# Patient Record
Sex: FEMALE | Race: OTHER | HISPANIC OR LATINO
[De-identification: names, ages, dates, MRNs, and addresses within clinical notes are randomized per-mention and may not be internally consistent; named-entity substitution may affect disease eponyms.]

---
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
## 2020-06-06 ENCOUNTER — TRANSCRIPTION ENCOUNTER (OUTPATIENT)
Age: 20
End: 2020-06-06

## 2020-08-25 ENCOUNTER — INPATIENT (INPATIENT)
Facility: HOSPITAL | Age: 20
LOS: 1 days | Discharge: HOME | DRG: 100 | End: 2020-08-27
Attending: INTERNAL MEDICINE | Admitting: INTERNAL MEDICINE
Payer: MEDICAID

## 2020-08-25 VITALS — WEIGHT: 121.25 LBS

## 2020-08-25 DIAGNOSIS — R56.9 UNSPECIFIED CONVULSIONS: ICD-10-CM

## 2020-08-25 LAB
ALBUMIN SERPL ELPH-MCNC: 3.7 G/DL — SIGNIFICANT CHANGE UP (ref 3.3–5.2)
ALBUMIN SERPL ELPH-MCNC: 3.7 G/DL — SIGNIFICANT CHANGE UP (ref 3.3–5.2)
ALP SERPL-CCNC: 44 U/L — SIGNIFICANT CHANGE UP (ref 40–120)
ALP SERPL-CCNC: 44 U/L — SIGNIFICANT CHANGE UP (ref 40–120)
ALT FLD-CCNC: 20 U/L — SIGNIFICANT CHANGE UP
ALT FLD-CCNC: 21 U/L — SIGNIFICANT CHANGE UP
AMPHET UR-MCNC: POSITIVE
ANION GAP SERPL CALC-SCNC: 12 MMOL/L — SIGNIFICANT CHANGE UP (ref 5–17)
ANION GAP SERPL CALC-SCNC: 14 MMOL/L — SIGNIFICANT CHANGE UP (ref 5–17)
APAP SERPL-MCNC: <7.5 UG/ML — LOW (ref 10–26)
APPEARANCE UR: CLEAR — SIGNIFICANT CHANGE UP
APTT BLD: 18.8 SEC — LOW (ref 27.5–35.5)
AST SERPL-CCNC: 25 U/L — SIGNIFICANT CHANGE UP
AST SERPL-CCNC: 27 U/L — SIGNIFICANT CHANGE UP
BACTERIA # UR AUTO: ABNORMAL
BARBITURATES UR SCN-MCNC: NEGATIVE — SIGNIFICANT CHANGE UP
BASOPHILS # BLD AUTO: 0.03 K/UL — SIGNIFICANT CHANGE UP (ref 0–0.2)
BASOPHILS NFR BLD AUTO: 0.2 % — SIGNIFICANT CHANGE UP (ref 0–2)
BENZODIAZ UR-MCNC: POSITIVE
BILIRUB SERPL-MCNC: 0.2 MG/DL — LOW (ref 0.4–2)
BILIRUB SERPL-MCNC: 0.3 MG/DL — LOW (ref 0.4–2)
BILIRUB UR-MCNC: NEGATIVE — SIGNIFICANT CHANGE UP
BLD GP AB SCN SERPL QL: SIGNIFICANT CHANGE UP
BUN SERPL-MCNC: 10 MG/DL — SIGNIFICANT CHANGE UP (ref 8–20)
BUN SERPL-MCNC: 9 MG/DL — SIGNIFICANT CHANGE UP (ref 8–20)
CALCIUM SERPL-MCNC: 8.1 MG/DL — LOW (ref 8.6–10.2)
CALCIUM SERPL-MCNC: 8.3 MG/DL — LOW (ref 8.6–10.2)
CHLORIDE SERPL-SCNC: 110 MMOL/L — HIGH (ref 98–107)
CHLORIDE SERPL-SCNC: 110 MMOL/L — HIGH (ref 98–107)
CK MB CFR SERPL CALC: 2.7 NG/ML — SIGNIFICANT CHANGE UP (ref 0–6.7)
CK SERPL-CCNC: 202 U/L — HIGH (ref 25–170)
CO2 SERPL-SCNC: 17 MMOL/L — LOW (ref 22–29)
CO2 SERPL-SCNC: 20 MMOL/L — LOW (ref 22–29)
COCAINE METAB.OTHER UR-MCNC: NEGATIVE — SIGNIFICANT CHANGE UP
COLOR SPEC: YELLOW — SIGNIFICANT CHANGE UP
CREAT SERPL-MCNC: 0.65 MG/DL — SIGNIFICANT CHANGE UP (ref 0.5–1.3)
CREAT SERPL-MCNC: 0.68 MG/DL — SIGNIFICANT CHANGE UP (ref 0.5–1.3)
DIFF PNL FLD: ABNORMAL
EOSINOPHIL # BLD AUTO: 0.01 K/UL — SIGNIFICANT CHANGE UP (ref 0–0.5)
EOSINOPHIL NFR BLD AUTO: 0.1 % — SIGNIFICANT CHANGE UP (ref 0–6)
EPI CELLS # UR: SIGNIFICANT CHANGE UP
ETHANOL SERPL-MCNC: <10 MG/DL — SIGNIFICANT CHANGE UP
GAS PNL BLDA: SIGNIFICANT CHANGE UP
GLUCOSE SERPL-MCNC: 76 MG/DL — SIGNIFICANT CHANGE UP (ref 70–99)
GLUCOSE SERPL-MCNC: 78 MG/DL — SIGNIFICANT CHANGE UP (ref 70–99)
GLUCOSE UR QL: NEGATIVE MG/DL — SIGNIFICANT CHANGE UP
HCG SERPL-ACNC: <4 MIU/ML — SIGNIFICANT CHANGE UP
HCT VFR BLD CALC: 38.4 % — SIGNIFICANT CHANGE UP (ref 34.5–45)
HGB BLD-MCNC: 13 G/DL — SIGNIFICANT CHANGE UP (ref 11.5–15.5)
HYALINE CASTS # UR AUTO: NEGATIVE /LPF — SIGNIFICANT CHANGE UP
IMM GRANULOCYTES NFR BLD AUTO: 0.8 % — SIGNIFICANT CHANGE UP (ref 0–1.5)
INR BLD: 0.99 RATIO — SIGNIFICANT CHANGE UP (ref 0.88–1.16)
KETONES UR-MCNC: ABNORMAL
LEUKOCYTE ESTERASE UR-ACNC: NEGATIVE — SIGNIFICANT CHANGE UP
LYMPHOCYTES # BLD AUTO: 1.01 K/UL — SIGNIFICANT CHANGE UP (ref 1–3.3)
LYMPHOCYTES # BLD AUTO: 6.5 % — LOW (ref 13–44)
MAGNESIUM SERPL-MCNC: 2 MG/DL — SIGNIFICANT CHANGE UP (ref 1.6–2.6)
MCHC RBC-ENTMCNC: 29.8 PG — SIGNIFICANT CHANGE UP (ref 27–34)
MCHC RBC-ENTMCNC: 33.9 GM/DL — SIGNIFICANT CHANGE UP (ref 32–36)
MCV RBC AUTO: 88.1 FL — SIGNIFICANT CHANGE UP (ref 80–100)
METHADONE UR-MCNC: NEGATIVE — SIGNIFICANT CHANGE UP
MONOCYTES # BLD AUTO: 1.03 K/UL — HIGH (ref 0–0.9)
MONOCYTES NFR BLD AUTO: 6.6 % — SIGNIFICANT CHANGE UP (ref 2–14)
NEUTROPHILS # BLD AUTO: 13.33 K/UL — HIGH (ref 1.8–7.4)
NEUTROPHILS NFR BLD AUTO: 85.8 % — HIGH (ref 43–77)
NITRITE UR-MCNC: NEGATIVE — SIGNIFICANT CHANGE UP
OPIATES UR-MCNC: NEGATIVE — SIGNIFICANT CHANGE UP
PCP SPEC-MCNC: SIGNIFICANT CHANGE UP
PCP UR-MCNC: NEGATIVE — SIGNIFICANT CHANGE UP
PH UR: 5 — SIGNIFICANT CHANGE UP (ref 5–8)
PHOSPHATE SERPL-MCNC: 2 MG/DL — LOW (ref 2.4–4.7)
PLATELET # BLD AUTO: 351 K/UL — SIGNIFICANT CHANGE UP (ref 150–400)
POTASSIUM SERPL-MCNC: 3.4 MMOL/L — LOW (ref 3.5–5.3)
POTASSIUM SERPL-MCNC: 4 MMOL/L — SIGNIFICANT CHANGE UP (ref 3.5–5.3)
POTASSIUM SERPL-SCNC: 3.4 MMOL/L — LOW (ref 3.5–5.3)
POTASSIUM SERPL-SCNC: 4 MMOL/L — SIGNIFICANT CHANGE UP (ref 3.5–5.3)
PROCALCITONIN SERPL-MCNC: 0.03 NG/ML — SIGNIFICANT CHANGE UP (ref 0.02–0.1)
PROT SERPL-MCNC: 6.6 G/DL — SIGNIFICANT CHANGE UP (ref 6.6–8.7)
PROT SERPL-MCNC: 6.8 G/DL — SIGNIFICANT CHANGE UP (ref 6.6–8.7)
PROT UR-MCNC: 30 MG/DL
PROTHROM AB SERPL-ACNC: 11.5 SEC — SIGNIFICANT CHANGE UP (ref 10.6–13.6)
RBC # BLD: 4.36 M/UL — SIGNIFICANT CHANGE UP (ref 3.8–5.2)
RBC # FLD: 14.1 % — SIGNIFICANT CHANGE UP (ref 10.3–14.5)
RBC CASTS # UR COMP ASSIST: ABNORMAL /HPF (ref 0–4)
SALICYLATES SERPL-MCNC: <0.6 MG/DL — LOW (ref 10–20)
SARS-COV-2 RNA SPEC QL NAA+PROBE: SIGNIFICANT CHANGE UP
SODIUM SERPL-SCNC: 141 MMOL/L — SIGNIFICANT CHANGE UP (ref 135–145)
SODIUM SERPL-SCNC: 142 MMOL/L — SIGNIFICANT CHANGE UP (ref 135–145)
SP GR SPEC: 1.03 — HIGH (ref 1.01–1.02)
THC UR QL: NEGATIVE — SIGNIFICANT CHANGE UP
TROPONIN T SERPL-MCNC: <0.01 NG/ML — SIGNIFICANT CHANGE UP (ref 0–0.06)
UROBILINOGEN FLD QL: NEGATIVE MG/DL — SIGNIFICANT CHANGE UP
WBC # BLD: 15.54 K/UL — HIGH (ref 3.8–10.5)
WBC # FLD AUTO: 15.54 K/UL — HIGH (ref 3.8–10.5)
WBC UR QL: NEGATIVE — SIGNIFICANT CHANGE UP

## 2020-08-25 PROCEDURE — 70450 CT HEAD/BRAIN W/O DYE: CPT | Mod: 26

## 2020-08-25 PROCEDURE — 71045 X-RAY EXAM CHEST 1 VIEW: CPT | Mod: 26

## 2020-08-25 PROCEDURE — 99223 1ST HOSP IP/OBS HIGH 75: CPT

## 2020-08-25 PROCEDURE — 99291 CRITICAL CARE FIRST HOUR: CPT | Mod: 25

## 2020-08-25 PROCEDURE — 31500 INSERT EMERGENCY AIRWAY: CPT

## 2020-08-25 PROCEDURE — 99233 SBSQ HOSP IP/OBS HIGH 50: CPT

## 2020-08-25 RX ORDER — POTASSIUM PHOSPHATE, MONOBASIC POTASSIUM PHOSPHATE, DIBASIC 236; 224 MG/ML; MG/ML
30 INJECTION, SOLUTION INTRAVENOUS ONCE
Refills: 0 | Status: COMPLETED | OUTPATIENT
Start: 2020-08-25 | End: 2020-08-25

## 2020-08-25 RX ORDER — CHLORHEXIDINE GLUCONATE 213 G/1000ML
15 SOLUTION TOPICAL EVERY 12 HOURS
Refills: 0 | Status: DISCONTINUED | OUTPATIENT
Start: 2020-08-25 | End: 2020-08-25

## 2020-08-25 RX ORDER — PROPOFOL 10 MG/ML
20 INJECTION, EMULSION INTRAVENOUS
Qty: 1000 | Refills: 0 | Status: DISCONTINUED | OUTPATIENT
Start: 2020-08-25 | End: 2020-08-25

## 2020-08-25 RX ORDER — SODIUM CHLORIDE 9 MG/ML
1000 INJECTION, SOLUTION INTRAVENOUS ONCE
Refills: 0 | Status: COMPLETED | OUTPATIENT
Start: 2020-08-25 | End: 2020-08-25

## 2020-08-25 RX ORDER — ETOMIDATE 2 MG/ML
20 INJECTION INTRAVENOUS ONCE
Refills: 0 | Status: COMPLETED | OUTPATIENT
Start: 2020-08-25 | End: 2020-08-25

## 2020-08-25 RX ORDER — LEVETIRACETAM 250 MG/1
1000 TABLET, FILM COATED ORAL EVERY 12 HOURS
Refills: 0 | Status: DISCONTINUED | OUTPATIENT
Start: 2020-08-25 | End: 2020-08-26

## 2020-08-25 RX ORDER — LEVETIRACETAM 250 MG/1
1000 TABLET, FILM COATED ORAL ONCE
Refills: 0 | Status: COMPLETED | OUTPATIENT
Start: 2020-08-25 | End: 2020-08-25

## 2020-08-25 RX ORDER — ENOXAPARIN SODIUM 100 MG/ML
40 INJECTION SUBCUTANEOUS DAILY
Refills: 0 | Status: DISCONTINUED | OUTPATIENT
Start: 2020-08-25 | End: 2020-08-27

## 2020-08-25 RX ORDER — PANTOPRAZOLE SODIUM 20 MG/1
40 TABLET, DELAYED RELEASE ORAL DAILY
Refills: 0 | Status: DISCONTINUED | OUTPATIENT
Start: 2020-08-25 | End: 2020-08-26

## 2020-08-25 RX ORDER — SUCCINYLCHOLINE CHLORIDE 100 MG/5ML
100 SYRINGE (ML) INTRAVENOUS ONCE
Refills: 0 | Status: COMPLETED | OUTPATIENT
Start: 2020-08-25 | End: 2020-08-25

## 2020-08-25 RX ORDER — CHLORHEXIDINE GLUCONATE 213 G/1000ML
1 SOLUTION TOPICAL
Refills: 0 | Status: DISCONTINUED | OUTPATIENT
Start: 2020-08-25 | End: 2020-08-27

## 2020-08-25 RX ADMIN — PANTOPRAZOLE SODIUM 40 MILLIGRAM(S): 20 TABLET, DELAYED RELEASE ORAL at 12:50

## 2020-08-25 RX ADMIN — Medication 100 MILLIGRAM(S): at 09:05

## 2020-08-25 RX ADMIN — POTASSIUM PHOSPHATE, MONOBASIC POTASSIUM PHOSPHATE, DIBASIC 83.33 MILLIMOLE(S): 236; 224 INJECTION, SOLUTION INTRAVENOUS at 14:08

## 2020-08-25 RX ADMIN — PROPOFOL 6.6 MICROGRAM(S)/KG/MIN: 10 INJECTION, EMULSION INTRAVENOUS at 13:30

## 2020-08-25 RX ADMIN — PROPOFOL 6.6 MICROGRAM(S)/KG/MIN: 10 INJECTION, EMULSION INTRAVENOUS at 09:30

## 2020-08-25 RX ADMIN — LEVETIRACETAM 400 MILLIGRAM(S): 250 TABLET, FILM COATED ORAL at 10:15

## 2020-08-25 RX ADMIN — SODIUM CHLORIDE 2000 MILLILITER(S): 9 INJECTION, SOLUTION INTRAVENOUS at 13:56

## 2020-08-25 RX ADMIN — ENOXAPARIN SODIUM 40 MILLIGRAM(S): 100 INJECTION SUBCUTANEOUS at 12:50

## 2020-08-25 RX ADMIN — CHLORHEXIDINE GLUCONATE 1 APPLICATION(S): 213 SOLUTION TOPICAL at 13:56

## 2020-08-25 RX ADMIN — ETOMIDATE 20 MILLIGRAM(S): 2 INJECTION INTRAVENOUS at 09:04

## 2020-08-25 RX ADMIN — CHLORHEXIDINE GLUCONATE 15 MILLILITER(S): 213 SOLUTION TOPICAL at 17:37

## 2020-08-25 RX ADMIN — LEVETIRACETAM 400 MILLIGRAM(S): 250 TABLET, FILM COATED ORAL at 17:36

## 2020-08-25 NOTE — ED PROVIDER NOTE - ATTENDING CONTRIBUTION TO CARE
Paul: I performed a face to face bedside interview with patient regarding history of present illness, review of symptoms and past medical history. I completed an independent physical exam and ordered tests/medications as needed.  I have discussed patient's plan of care with the resident. The resident assisted in  executing the discussed plan. I was available for any questions or issues that may have arose during the execution of the plan of care.

## 2020-08-25 NOTE — ED PROVIDER NOTE - PROGRESS NOTE DETAILS
Paul CARBAJAL: During exam pt began having decorticate posturing and stiffening of limbs, then began having upper extremity slight tonic clonic movements. 2mg ativan IV given, patient intubated for airway protection. Priority CT called, patient taken for CTH.

## 2020-08-25 NOTE — H&P ADULT - NSHPPHYSICALEXAM_GEN_ALL_CORE
Physical Examination:    VITALS AT TIME OF EXAM:  HR: 102 sinus tach  BP: 121/67  RR: 20  SPO2: 100% on 40% fio2    General: Sedated, intubated     HEENT: Pupils equal, 3-4mm, reactive to light.  Symmetric. No scleral icterus or injection    PULM: Clear to auscultation bilaterally, no wheezes, rales or rhonchi, no significant sputum production or increased respiratory effort    NECK: Supple, no lymphadenopathy, trachea midline    CVS: Sinus tach, normal rhythm, no murmurs, +s1/s2    ABD: Soft, nondistended, normoactive bowel sounds    EXT: No edema, nontender    SKIN: Warm and well perfused    NEURO: Sedated, intubated, b/l upper extremity shivering, no posturing, no obvious focal deficits    DEVICES: N  LINES: N  CROWDER: Y Physical Examination:    VITALS AT TIME OF EXAM:  HR: 102 sinus tach  BP: 121/67  RR: 20  SPO2: 100% on 40% fio2    General: Sedated, intubated     HEENT: Pupils equal, 3-4mm, reactive to light.  Symmetric. No scleral icterus or injection    PULM: Clear to auscultation bilaterally, no wheezes, rales or rhonchi, no significant sputum production or increased respiratory effort    NECK: Supple, no lymphadenopathy, trachea midline    CVS: Sinus tach, normal rhythm, no murmurs, +s1/s2    ABD: Soft, nondistended, normoactive bowel sounds    EXT: No edema    SKIN: Warm and well perfused, no signs of track marks    NEURO: Sedated, intubated, b/l upper extremity shivering, no posturing, no obvious focal deficits    DEVICES: N  LINES: N  CROWDER: Y

## 2020-08-25 NOTE — PATIENT PROFILE ADULT - NSPROEDALEARNPREF_GEN_A_NUR
computer/internet/skill demonstration/pictorial/verbal instruction/group instruction/individual instruction/written material/video

## 2020-08-25 NOTE — ED PROVIDER NOTE - CLINICAL SUMMARY MEDICAL DECISION MAKING FREE TEXT BOX
Patient unresponisve after possible ingestion, now s/p 3 witnessed seizures. Intubated and sedated for airway protection. Plan for labs, tox screen, salicylate/acetaminophen levels, IVF, CTH. MICU consult.

## 2020-08-25 NOTE — ED PROVIDER NOTE - PHYSICAL EXAMINATION
Gen: Ill appearing unresponsive thin female, good hygiene, in moderate distress  Head: NC/AT, pupils dilated and reactive bilaterally. +corneal reflex. No tongue laceration.   Neck: trachea midline  Resp:  No distress, CTAB  CV: tachycardic, regular rhythm  GI: soft, non-distended  Ext: no deformities, FROM  Neuro:  Localizes to pain, +corneal reflex. Moving extremities.   Skin:  Warm and dry as visualized

## 2020-08-25 NOTE — H&P ADULT - NSHPLABSRESULTS_GEN_ALL_CORE
Medications:  levETIRAcetam  IVPB 1000 milliGRAM(s) IV Intermittent every 12 hours  LORazepam   Injectable 2 milliGRAM(s) IV Push every 1 hour PRN  propofol Infusion 20 MICROgram(s)/kG/Min IV Continuous <Continuous>  enoxaparin Injectable 40 milliGRAM(s) SubCutaneous daily  pantoprazole  Injectable 40 milliGRAM(s) IV Push daily  chlorhexidine 0.12% Liquid 15 milliLiter(s) Oral Mucosa every 12 hours  chlorhexidine 2% Cloths 1 Application(s) Topical <User Schedule>    ICU Vital Signs Last 24 Hrs  T(C): 35.7 (25 Aug 2020 10:00), Max: 35.7 (25 Aug 2020 10:00)  T(F): 96.3 (25 Aug 2020 10:00), Max: 96.3 (25 Aug 2020 10:00)  HR: 104 (25 Aug 2020 10:16) (90 - 121)  BP: 124/68 (25 Aug 2020 10:00) (124/68 - 135/76)  BP(mean): --  ABP: --  ABP(mean): --  RR: 16 (25 Aug 2020 10:16) (16 - 16)  SpO2: 100% (25 Aug 2020 10:16) (100% - 100%)    Vital Signs Last 24 Hrs  T(C): 35.7 (25 Aug 2020 10:00), Max: 35.7 (25 Aug 2020 10:00)  T(F): 96.3 (25 Aug 2020 10:00), Max: 96.3 (25 Aug 2020 10:00)  HR: 104 (25 Aug 2020 10:16) (90 - 121)  BP: 124/68 (25 Aug 2020 10:00) (124/68 - 135/76)  BP(mean): --  RR: 16 (25 Aug 2020 10:16) (16 - 16)  SpO2: 100% (25 Aug 2020 10:16) (100% - 100%)    I&O's Detail    LABS:                        13.0   15.54 )-----------( 351      ( 25 Aug 2020 09:21 )             38.4       CAPILLARY BLOOD GLUCOSE  POCT Blood Glucose.: 105 mg/dL (25 Aug 2020 08:47)    PT/INR - ( 25 Aug 2020 09:21 )   PT: 11.5 sec;   INR: 0.99 ratio    PTT - ( 25 Aug 2020 09:21 )  PTT:18.8 sec    CULTURES: N/a      RADIOLOGY: < from: CT Head No Cont (08.25.20 @ 09:34) >      FINDINGS:    The ventricular and sulcal size and configuration is age appropriate.   There is no acute loss of gray-white differentiation.    There is no evidence of mass effect, midline shift, acute intracranial hemorrhage, or extra-axial collections.     The calvarium is intact. The paranasal sinuses are clear.The mastoid air cells are predominantly clear. The orbits are unremarkable.      IMPRESSION:    No CT evidence of acute hemorrhage or acute territorial infarction.

## 2020-08-25 NOTE — PROGRESS NOTE ADULT - ASSESSMENT
HPI/INTERVAL EVENTS: no events  no seizures on EEG    EXAM:   intubated, sedated on propofol 30 mcg/kg, arousable   PERRL  reg rhythm  lungs clear  abd soft  no edema  brisk capillary refill  no central lines  +fortune    RADIOLOGY REVIEWED:  cxr- no infiltrates    IMPRESSION/ASSESSMENT & PLAN:   19 y/o female with PMHx of Depression, Migraines, prior Suicide attempt by NSAID OD in 2018 pres  19 yo female with hx of depression, migraines, prior suicide attempt in 2018, presented with AMS/encephalopathy, witnessed seizures, acute respiratory failure requiring intubation. Utox positive for amphetamines.  Substance abuse/overdose vs suicide attempt.  No seizures on EEG.  - awakening trial and will try to extubate  - continue keppra for now  - will need constant obs and then psych eval in AM  Mother updated at bedside.

## 2020-08-25 NOTE — PROGRESS NOTE ADULT - SUBJECTIVE AND OBJECTIVE BOX
[facility]                                        Neurology at [place]                                 Tamar Acosta, & Lenard                                  [address]                                        [place], [place], [address]                                             [phone]          Addendum:     Toxicology positive for Amphetamine and Benzodiazepine (received Versed/Ativan prior to tox).     [facility] checked and no prescriptions for amphetamine were dispensed.

## 2020-08-25 NOTE — CONSULT NOTE ADULT - ASSESSMENT
The patient is a 20y Female with new onset seizure.     Seizure.   Unclear etiology.   She has history of overdose.   Substance use would be the most likely cause (either prescribed or otherwise).   Agree with checking toxicology.   If her home medication can be confirmed then blood level may be helpful.   No objection to continuing Keppra for now.   Would begin c-EEG once in ICU.     Depression  Has prior suicide attempt.   Once patient is awake would recommend consulting the Behavioral Health team.     Case discussed with MICU team (Dr Kong attending).

## 2020-08-25 NOTE — AIRWAY REMOVAL NOTE  ADULT & PEDS - ARTIFICAL AIRWAY REMOVAL COMMENTS
Patient suctioned orally and endotracheally pre extubation and orally post.  Voice intact.  Tolerated well.

## 2020-08-25 NOTE — H&P ADULT - ASSESSMENT
Pt is a 19 y/o female with PMHx of Depression, Migraines, prior Suicide attempt by NSAID OD in 2018 here with:    Assessment:  1. Acute hypoxic respiratory failure- secondary to status epilepticus, intubated in ED for airway protection  2. Status Epilepticus- new onset seizures  3. Encephalopathy- post ictal  4. Possible toxic ingestion/drug OD    Plan:  - Admit to MICU  - On VAC 16/400/40%/5, actively titrating to maintain SpO2 92-96%. Stat ABG now. Vent bundle ordered: Peridex, Protonix, HOB >30  - On Propofol for seizure suppression/sedation.   - 1g Keppra ordered, give first dose stat, continue 1g BID  - 2mg IV Ativan PRN breakthrough seizure  - Stat 24h EEG ordered   - No meningeal signs, afebrile, leukocytosis likely reactive. Will send blood cultures, U/A. Hold off on empiric antibiotics for now. Trend WBC/temp curve  - Neuro consulted (Dr Boyle)  - Check procal, lactate (on ABG), beta HCG, cardiac enzymes, TTE, CK  - EKG reviewed, QTC prolonged 515.  Will monitor. No arrythmia or HD instability.   - Chemistries, utox, NETO, tylenol/asa level sent, pending, calculate osmolar gap, tox consult  - Lovenox for dvt ppx    Dispo: Critically ill. Full code. Spoke to father (Jhonny), Mother (Fallon), unaware of current medications, reportedly bottles still "full", will get back to me with full medication list.     CRITICAL CARE TIME SPENT: 48 mins assessing presenting problems of acute illness that poses high probability of life threatening deterioration or end organ damage/dysfunction.  Medical decision making including initiating plan of care, reviewing data, reviewing radiology, direct patient bedside evaluation and interpretation of vital signs, any necessary ventilator management, discussion with multidisciplinary team, discussing goals of care with patient/family, all non inclusive of procedures Pt is a 19 y/o female with PMHx of Depression, Migraines, prior Suicide attempt by NSAID OD in 2018 here with:    Assessment:  1. Acute hypoxic respiratory failure- secondary to status epilepticus, intubated in ED for airway protection  2. Status Epilepticus- new onset seizures  3. Encephalopathy- post ictal  4. Possible toxic ingestion/drug OD    Plan:  - Admit to MICU  - On VAC 16/400/40%/5, actively titrating to maintain SpO2 92-96%. Stat ABG now. Vent bundle ordered: Peridex, Protonix, HOB >30  - On Propofol for seizure suppression/sedation.   - 1g Keppra ordered, give first dose stat, continue 1g BID  - 2mg IV Ativan PRN breakthrough seizure  - Stat 24h EEG ordered   - No meningeal signs, afebrile, leukocytosis likely reactive. Will send blood cultures, U/A. Hold off on empiric antibiotics for now. Trend WBC/temp curve  - Neuro consulted (Dr Boyle)  - Check procal, lactate (on ABG), beta HCG, cardiac enzymes, TTE, CK  - EKG reviewed, QTC prolonged 515.  Will monitor. No arrythmia or HD instability.   - Chemistries, utox, NETO, tylenol/asa level sent, pending, calculate osmolar gap, tox consult  - Lovenox for dvt ppx    Dispo: Critically ill. Full code. Spoke to father (Jhonny), Mother (Fallon, #: [phone]), unaware of current medications, reportedly bottles still "full", will get back to me with full medication list.     CRITICAL CARE TIME SPENT: 48 mins assessing presenting problems of acute illness that poses high probability of life threatening deterioration or end organ damage/dysfunction.  Medical decision making including initiating plan of care, reviewing data, reviewing radiology, direct patient bedside evaluation and interpretation of vital signs, any necessary ventilator management, discussion with multidisciplinary team, discussing goals of care with patient/family, all non inclusive of procedures

## 2020-08-25 NOTE — H&P ADULT - ATTENDING COMMENTS
20F w/ PMHx depression, migraines and prior suicide attempt ( w/ NSAIDs in 2018) was found down and unresponsive by her father this AM. As per EMS pt was posturing and soon thereafter had a seizure which responded to versed. In ED pt had an episode of GTC. Intubated for airway protection. CTH normal. EKG w/ prolonged QTc  Started on Keppra, IV ativan PRN and sedated w/ propofol. Utox, CK pending. Keep NPO. IV hydration. EEG and neuro consulted. Plan to call poison control after Utox results

## 2020-08-25 NOTE — ED ADULT TRIAGE NOTE - CHIEF COMPLAINT QUOTE
pt comes to ed from home after being found on floor by sister. patient last seen last night. patient had 2 witnessed seizures with ems and received 2.5 of midazalom. patient arrives not responsive; dr olivo to bedside. patient with posturing and transient rigidity. decision to intubate

## 2020-08-25 NOTE — CHART NOTE - NSCHARTNOTEFT_GEN_A_CORE
PRELIMINARY EEG REPORT    EEG reviewed to 15:45  Date: 08-25-20      - No epileptiform pattern or seizure seen.    ____________________________________    Alicia Daniels MD  Attending Physician, [facility] [place]

## 2020-08-25 NOTE — CONSULT NOTE ADULT - SUBJECTIVE AND OBJECTIVE BOX
[facility]                                        Neurology at [place]                                  Tamar Acosta & Lenard                                      [address]                                           [place], [place], [address]                                                [phone]        CC: Seizure/status epilepticus.     HISTORY:  The patient is a 20y Female with no reported seizure history now presenting after being found unresponsive at home. EMS was called. While en route to the ER she developed what was described as decorticate posturing (flexor posturing) and had witnessed seizures in the ambulance. She received IV Versed. In the ER she had further seizures. She was given Ativan and ultimately intubated and started on propofol. She also received Keppra.   She has a prior history of suicide attempt secondary to overdose.     PAST MEDICAL & SURGICAL HISTORY:  Migraines  Depression    MEDICATION PRIOR TO ADMISSION:  Unknown.    MEDICATIONS  (STANDING):  chlorhexidine 0.12% Liquid 15 milliLiter(s) Oral Mucosa every 12 hours  chlorhexidine 2% Cloths 1 Application(s) Topical <User Schedule>  enoxaparin Injectable 40 milliGRAM(s) SubCutaneous daily  levETIRAcetam  IVPB 1000 milliGRAM(s) IV Intermittent every 12 hours  pantoprazole  Injectable 40 milliGRAM(s) IV Push daily  propofol Infusion 20 MICROgram(s)/kG/Min (6.6 mL/Hr) IV Continuous <Continuous>    MEDICATIONS  (PRN):  LORazepam   Injectable 2 milliGRAM(s) IV Push every 1 hour PRN Seizure activity    Allergies  amoxicillin (Unknown)    SOCIAL HISTORY:  Reported non smoker.   Prior history of suicide attempt as above.     FAMILY HISTORY:  Patient unable to provide family history due to condition.     ROS:  Constitutional: Unobtainable due to patient's condition.   Neuro: Unobtainable due to patient's condition.   Eyes: Unobtainable due to patient's condition.   Ears/nose/throat: Unobtainable due to patient's condition.   Cardiac: Unobtainable due to patient's condition.   Respiratory: Unobtainable due to patient's condition.   GI: Unobtainable due to patient's condition.   : Unobtainable due to patient's condition..  Integumentary: Unobtainable due to patient's condition.  Psych: Unobtainable due to patient's condition.  Heme: Unobtainable due to patient's condition.     Exam:  Vital Signs Last 24 Hrs  T(C): 35.7 (25 Aug 2020 10:00), Max: 35.7 (25 Aug 2020 10:00)  T(F): 96.3 (25 Aug 2020 10:00), Max: 96.3 (25 Aug 2020 10:00)  HR: 104 (25 Aug 2020 10:16) (90 - 121)  BP: 124/68 (25 Aug 2020 10:00) (124/68 - 135/76)  RR: 16 (25 Aug 2020 10:16) (16 - 16)  SpO2: 100% (25 Aug 2020 10:16) (100% - 100%)  General: NAD.   Carotid bruits absent.     Mental status: The patient is sedated on mechanical ventilator.     Cranial nerves: There is no papilledema (direct ophthalmoscope). Pupils react sluggishly to light. There is no blink to threat to confrontation. Extraocular motion is full to oculocephalic maneuvers. Corneal reflexes present. Palate and tongue cannot be assessed.     Motor/Sensory: There is normal bulk and tone.  Symmetric limb movement to stimuli. Does not appear purposeful.     Reflexes: 2+ throughout and plantar responses are flexor.    Cerebellar: Cannot be assessed.     LABS:                         13.0   15.54 )-----------( 351      ( 25 Aug 2020 09:21 )             38.4       PT/INR - ( 25 Aug 2020 09:21 )   PT: 11.5 sec;   INR: 0.99 ratio    PTT - ( 25 Aug 2020 09:21 )  PTT:18.8 sec    Urine toxicology pending.    RADIOLOGY   CT head images reviewed (and concur with report): There is no acute pathology.

## 2020-08-25 NOTE — CHART NOTE - NSCHARTNOTEFT_GEN_A_CORE
PRELIMINARY EEG REPORT    EEG reviewed to 17:00  Date: 08-25-20      - No epileptiform pattern or seizure seen.    Kevin Garrison MD PGY-5  Epilepsy Fellow    This Preliminary report is based on fellow review. Final report pending attending review.

## 2020-08-25 NOTE — PROGRESS NOTE ADULT - SUBJECTIVE AND OBJECTIVE BOX
On Admission  20  HPI:  Pt is a 19 y/o female with PMHx of Depression, Migraines, prior Suicide attempt by NSAID OD in 2018 presents after being found unresponsive by father at home earlier today. Pt reportedly found by father in her bedroom this AM, unresponsive, diaphoretic.  Per pt's father known history of drug use, no pill bottles or needles in surrounding area. EMS was called, en route, pt developed decorticate posturing and shortly after had 2 witnessed seizures, s/p IV Versed. In ED, pt with further generalized tonic clonic activity, s/p IV Ativan, but remained largely unresponsive. Pt was subsequently intubated for airway protection, started on propofol gtt. Stat CT head negative for acute intracranial pathology. ICU consulted.     Seen and examined in ED. Pt on propofol gtt, unresponsive, mild posturing with "shaking", appeared almost like shivering/rigors. On VAC 16/400/40/5. Normotensive, mild sinus tach to low 100's, saturating well. (25 Aug 2020 10:42)    PAST MEDICAL & SURGICAL HISTORY:  Migraines  Depression      Antimicrobial:    Cardiovascular:    Pulmonary:    Hematalogic:  enoxaparin Injectable 40 milliGRAM(s) SubCutaneous daily    Other:  chlorhexidine 0.12% Liquid 15 milliLiter(s) Oral Mucosa every 12 hours  chlorhexidine 2% Cloths 1 Application(s) Topical <User Schedule>  levETIRAcetam  IVPB 1000 milliGRAM(s) IV Intermittent every 12 hours  LORazepam   Injectable 2 milliGRAM(s) IV Push every 1 hour PRN  pantoprazole  Injectable 40 milliGRAM(s) IV Push daily  propofol Infusion 20 MICROgram(s)/kG/Min IV Continuous <Continuous>      Drug Dosing Weight    Weight (kg): 61.7 (25 Aug 2020 13:25)    T(C): 36.7 (20 @ 17:00), Max: 36.7 (20 @ 13:25)  HR: 96 (20 @ 17:00)  BP: 116/76 (20 @ 17:00)  BP(mean): 92 (20 @ 17:00)  ABP: --  ABP(mean): --  RR: 28 (20 @ 17:00)  SpO2: 100% (08-25-20 @ 17:00)    ABG - ( 25 Aug 2020 11:03 )  pH, Arterial: 7.36  pH, Blood: x     /  pCO2: 35    /  pO2: 199   / HCO3: 20    / Base Excess: -4.8  /  SaO2: 100                       Mode: AC/ CMV (Assist Control/ Continuous Mandatory Ventilation)  RR (machine): 16  TV (machine): 400  FiO2: 30  PEEP: 5  MAP: 8  PIP: 15        LABS:  CBC Full  -  ( 25 Aug 2020 09:21 )  WBC Count : 15.54 K/uL  RBC Count : 4.36 M/uL  Hemoglobin : 13.0 g/dL  Hematocrit : 38.4 %  Platelet Count - Automated : 351 K/uL  Mean Cell Volume : 88.1 fl  Mean Cell Hemoglobin : 29.8 pg  Mean Cell Hemoglobin Concentration : 33.9 gm/dL  Auto Neutrophil # : 13.33 K/uL  Auto Lymphocyte # : 1.01 K/uL  Auto Monocyte # : 1.03 K/uL  Auto Eosinophil # : 0.01 K/uL  Auto Basophil # : 0.03 K/uL  Auto Neutrophil % : 85.8 %  Auto Lymphocyte % : 6.5 %  Auto Monocyte % : 6.6 %  Auto Eosinophil % : 0.1 %  Auto Basophil % : 0.2 %    08    142  |  110<H>  |  9.0  ----------------------------<  78  3.4<L>   |  20.0<L>  |  0.65    Ca    8.1<L>      25 Aug 2020 12:57  Phos  2.0     08-25  Mg     2.0     08-25    TPro  6.6  /  Alb  3.7  /  TBili  0.2<L>  /  DBili  x   /  AST  25  /  ALT  20  /  AlkPhos  44  08-25    PT/INR - ( 25 Aug 2020 09:21 )   PT: 11.5 sec;   INR: 0.99 ratio         PTT - ( 25 Aug 2020 09:21 )  PTT:18.8 sec  Urinalysis Basic - ( 25 Aug 2020 11:03 )    Color: Yellow / Appearance: Clear / S.030 / pH: x  Gluc: x / Ketone: Trace  / Bili: Negative / Urobili: Negative mg/dL   Blood: x / Protein: 30 mg/dL / Nitrite: Negative   Leuk Esterase: Negative / RBC: 6-10 /HPF / WBC Negative   Sq Epi: x / Non Sq Epi: Occasional / Bacteria: Occasional        ____________________________________________________________________________________________________

## 2020-08-25 NOTE — H&P ADULT - HISTORY OF PRESENT ILLNESS
Pt is a 19 y/o female with PMHx of Depression, Migraines, prior Suicide attempt by NSAID OD in 2018 presents after being found unresponsive by father at home earlier today. Pt reportedly found by father in her bedroom this AM, unresponsive, diaphoretic.  Per pt's father known history of drug use, no pill bottles or needles in surrounding area. EMS was called, en route, pt developed decorticate posturing and shortly after had 2 witnessed seizures, s/p IV Versed. In ED, pt with further generalized tonic clonic activity, s/p IV Ativan, but remained largely unresponsive. Pt was subsequently intubated for airway protection, started on propofol gtt. Stat CT head negative for acute intracranial pathology. ICU consulted.     Seen and examined in ED. Pt on propofol gtt, unresponsive, mild posturing with "shaking", appeared almost like shivering/rigors. On VAC 16/400/40/5. Normotensive, mild sinus tach to low 100's, saturating well.

## 2020-08-25 NOTE — ED PROVIDER NOTE - OBJECTIVE STATEMENT
History obtained from EMS and from patient's father at bedside.   Translation provided by ED : Glenn  20F h/o depression, one prior overdose on NSAIDs p/w unresponsiveness and seizures. Pt founf unresponsive in bedroom this morning, no evidence of drug use (no needles, empty pill bottles, drug paraphernalia in bedroom), had 2 witnessed seizures with EMS, started with decorticate posturing evolving into GTC, given 2.5mg versed IV by EMS. No history of seizures.

## 2020-08-26 LAB
ANION GAP SERPL CALC-SCNC: 11 MMOL/L — SIGNIFICANT CHANGE UP (ref 5–17)
BASOPHILS # BLD AUTO: 0.03 K/UL — SIGNIFICANT CHANGE UP (ref 0–0.2)
BASOPHILS NFR BLD AUTO: 0.2 % — SIGNIFICANT CHANGE UP (ref 0–2)
BUN SERPL-MCNC: 4 MG/DL — LOW (ref 8–20)
CALCIUM SERPL-MCNC: 8.2 MG/DL — LOW (ref 8.6–10.2)
CHLORIDE SERPL-SCNC: 108 MMOL/L — HIGH (ref 98–107)
CO2 SERPL-SCNC: 21 MMOL/L — LOW (ref 22–29)
CREAT SERPL-MCNC: 0.62 MG/DL — SIGNIFICANT CHANGE UP (ref 0.5–1.3)
EOSINOPHIL # BLD AUTO: 0 K/UL — SIGNIFICANT CHANGE UP (ref 0–0.5)
EOSINOPHIL NFR BLD AUTO: 0 % — SIGNIFICANT CHANGE UP (ref 0–6)
GLUCOSE SERPL-MCNC: 108 MG/DL — HIGH (ref 70–99)
HCT VFR BLD CALC: 35.6 % — SIGNIFICANT CHANGE UP (ref 34.5–45)
HGB BLD-MCNC: 12.1 G/DL — SIGNIFICANT CHANGE UP (ref 11.5–15.5)
IMM GRANULOCYTES NFR BLD AUTO: 0.4 % — SIGNIFICANT CHANGE UP (ref 0–1.5)
LYMPHOCYTES # BLD AUTO: 0.82 K/UL — LOW (ref 1–3.3)
LYMPHOCYTES # BLD AUTO: 5.5 % — LOW (ref 13–44)
MAGNESIUM SERPL-MCNC: 1.9 MG/DL — SIGNIFICANT CHANGE UP (ref 1.6–2.6)
MCHC RBC-ENTMCNC: 29.1 PG — SIGNIFICANT CHANGE UP (ref 27–34)
MCHC RBC-ENTMCNC: 34 GM/DL — SIGNIFICANT CHANGE UP (ref 32–36)
MCV RBC AUTO: 85.6 FL — SIGNIFICANT CHANGE UP (ref 80–100)
MONOCYTES # BLD AUTO: 1.04 K/UL — HIGH (ref 0–0.9)
MONOCYTES NFR BLD AUTO: 6.9 % — SIGNIFICANT CHANGE UP (ref 2–14)
NEUTROPHILS # BLD AUTO: 13.03 K/UL — HIGH (ref 1.8–7.4)
NEUTROPHILS NFR BLD AUTO: 87 % — HIGH (ref 43–77)
PHOSPHATE SERPL-MCNC: 3.2 MG/DL — SIGNIFICANT CHANGE UP (ref 2.4–4.7)
PLATELET # BLD AUTO: 285 K/UL — SIGNIFICANT CHANGE UP (ref 150–400)
POTASSIUM SERPL-MCNC: 3.7 MMOL/L — SIGNIFICANT CHANGE UP (ref 3.5–5.3)
POTASSIUM SERPL-SCNC: 3.7 MMOL/L — SIGNIFICANT CHANGE UP (ref 3.5–5.3)
RBC # BLD: 4.16 M/UL — SIGNIFICANT CHANGE UP (ref 3.8–5.2)
RBC # FLD: 14.1 % — SIGNIFICANT CHANGE UP (ref 10.3–14.5)
SARS-COV-2 IGG SERPL QL IA: NEGATIVE — SIGNIFICANT CHANGE UP
SARS-COV-2 IGM SERPL IA-ACNC: <3.8 AU/ML — SIGNIFICANT CHANGE UP
SODIUM SERPL-SCNC: 140 MMOL/L — SIGNIFICANT CHANGE UP (ref 135–145)
WBC # BLD: 14.98 K/UL — HIGH (ref 3.8–10.5)
WBC # FLD AUTO: 14.98 K/UL — HIGH (ref 3.8–10.5)

## 2020-08-26 PROCEDURE — 99232 SBSQ HOSP IP/OBS MODERATE 35: CPT

## 2020-08-26 PROCEDURE — 99233 SBSQ HOSP IP/OBS HIGH 50: CPT

## 2020-08-26 PROCEDURE — 95720 EEG PHY/QHP EA INCR W/VEEG: CPT

## 2020-08-26 RX ORDER — LEVETIRACETAM 250 MG/1
1000 TABLET, FILM COATED ORAL
Refills: 0 | Status: DISCONTINUED | OUTPATIENT
Start: 2020-08-26 | End: 2020-08-26

## 2020-08-26 RX ADMIN — LEVETIRACETAM 400 MILLIGRAM(S): 250 TABLET, FILM COATED ORAL at 06:08

## 2020-08-26 RX ADMIN — CHLORHEXIDINE GLUCONATE 1 APPLICATION(S): 213 SOLUTION TOPICAL at 06:08

## 2020-08-26 RX ADMIN — ENOXAPARIN SODIUM 40 MILLIGRAM(S): 100 INJECTION SUBCUTANEOUS at 12:17

## 2020-08-26 NOTE — PROGRESS NOTE ADULT - REASON FOR ADMISSION
Acute hypoxic respiratory failure, status epilepticus

## 2020-08-26 NOTE — PROGRESS NOTE ADULT - ASSESSMENT
Possible toxic ingestion  Drug abuse/OD  Acute hypoxic respiratory failure, resolved  Encephalopathy, resolving  Seizures    Patient seen and examined     Neuro: Mentation appears to be at baseline. Pt does admit to wanting to hurt herself but did not want to confide in me further. Woody consulted. On 1:1 observation. No seizures seen on EEG. Can be stopped today. CTH normal. Decide on continuing Keppra based on Neuro recs  Cardiovascular: No active issues  Resp/Chest: Maintain SpO2 > 92%. Oxygenation and ventilating well  GI/Nutrition: Regular diet  ID: No active issues  Renal: Avoid nephrotoxic agents. Strict I&O  Endocrinology: Maintain Blood sugar < 180. ISS as per protocol  Haem/Oncology:  DVT ppx w/ Lovenox    Critical Care time: 35 minutes assessing presenting problems of acute illness that poses high probability of life threatening deterioration or end organ damage/dysfunction.  Medical decision making including Initiating plan of care, reviewing data, reviewing radiology, discussing with multidisciplinary team, non inclusive of procedures

## 2020-08-26 NOTE — PROGRESS NOTE ADULT - SUBJECTIVE AND OBJECTIVE BOX
ACCEPTANCE NOTE:  Pt downgraded from ICU to med floor    CHIEF COMPLAINT/INTERVAL HISTORY:    Patient is a 20y old  Female who presents with a chief complaint of Acute hypoxic respiratory failure, status epilepticus (26 Aug 2020 16:13)      HPI:  Pt is a 19 y/o female with PMHx of Depression, Migraines, prior Suicide attempt by NSAID OD in 2018 presents after being found unresponsive by father at home earlier today. Pt reportedly found by father in her bedroom this AM, unresponsive, diaphoretic.  Per pt's father known history of drug use, no pill bottles or needles in surrounding area. EMS was called, en route, pt developed decorticate posturing and shortly after had 2 witnessed seizures, s/p IV Versed. In ED, pt with further generalized tonic clonic activity, s/p IV Ativan, but remained largely unresponsive. Pt was subsequently intubated for airway protection, started on propofol gtt. Stat CT head negative for acute intracranial pathology. ICU consulted.     Seen and examined in ED. Pt on propofol gtt, unresponsive, mild posturing with "shaking", appeared almost like shivering/rigors. On VAC 16/400/40/5. Normotensive, mild sinus tach to low 100's, saturating well. (25 Aug 2020 10:42)      SUBJECTIVE & OBJECTIVE/ ROS: Pt seen and examined at bedside. Patient appears comfortable on RA. c/o ringing in the ears. No chest pain, palpitations, sob, light headedness/dizziness, difficulty breathing/cough, fevers/chills, abdominal pain, n/v, diarrhea/constipation, dysuria or increased urinary frequency.     ICU Vital Signs Last 24 Hrs  T(C): 37.3 (26 Aug 2020 15:46), Max: 37.3 (26 Aug 2020 15:46)  T(F): 99.2 (26 Aug 2020 15:46), Max: 99.2 (26 Aug 2020 15:46)  HR: 96 (26 Aug 2020 15:46) (86 - 105)  BP: 136/85 (26 Aug 2020 15:46) (100/66 - 136/85)  BP(mean): 94 (26 Aug 2020 15:00) (78 - 100)  ABP: --  ABP(mean): --  RR: 20 (26 Aug 2020 15:46) (15 - 46)  SpO2: 97% (26 Aug 2020 15:46) (91% - 100%)      REVIEW OF SYSTEMS:    CONSTITUTIONAL: No weakness, fevers or chills  EYES/ENT: No visual changes;  No vertigo or throat pain   NECK: No pain or stiffness  RESPIRATORY: No cough, wheezing, hemoptysis; No shortness of breath  CARDIOVASCULAR: No chest pain or palpitations  GASTROINTESTINAL: No abdominal or epigastric pain. No nausea, vomiting, or hematemesis; No diarrhea or constipation. No melena or hematochezia.  GENITOURINARY: No dysuria, frequency or hematuria  NEUROLOGICAL: No numbness or weakness  SKIN: No itching, rashes        MEDICATIONS  (STANDING):  chlorhexidine 2% Cloths 1 Application(s) Topical <User Schedule>  enoxaparin Injectable 40 milliGRAM(s) SubCutaneous daily    MEDICATIONS  (PRN):  LORazepam   Injectable 1 milliGRAM(s) IV Push every 4 hours PRN seizure      LABS:                        12.1   14.98 )-----------( 285      ( 26 Aug 2020 05:15 )             35.6         140  |  108<H>  |  4.0<L>  ----------------------------<  108<H>  3.7   |  21.0<L>  |  0.62    Ca    8.2<L>      26 Aug 2020 05:15  Phos  3.2       Mg     1.9         TPro  6.6  /  Alb  3.7  /  TBili  0.2<L>  /  DBili  x   /  AST  25  /  ALT  20  /  AlkPhos  44      PT/INR - ( 25 Aug 2020 09:21 )   PT: 11.5 sec;   INR: 0.99 ratio         PTT - ( 25 Aug 2020 09:21 )  PTT:18.8 sec  Urinalysis Basic - ( 25 Aug 2020 11:03 )    Color: Yellow / Appearance: Clear / S.030 / pH: x  Gluc: x / Ketone: Trace  / Bili: Negative / Urobili: Negative mg/dL   Blood: x / Protein: 30 mg/dL / Nitrite: Negative   Leuk Esterase: Negative / RBC: 6-10 /HPF / WBC Negative   Sq Epi: x / Non Sq Epi: Occasional / Bacteria: Occasional        CAPILLARY BLOOD GLUCOSE          RECENT CULTURES:        20 @ 07:20 @ 07:00  --------------------------------------------------------  IN: 1571.5 mL / OUT: 870 mL / NET: 701.5 mL          RADIOLOGY & ADDITIONAL TESTS:      PHYSICAL EXAM:    GENERAL: NAD, well-groomed, well-developed  HEAD:  Atraumatic, Normocephalic  EYES: EOMI, PERRLA, conjunctiva and sclera clear  ENMT: Moist mucous membranes  NECK: Supple, No JVD  NERVOUS SYSTEM:  Alert & Oriented X3, Motor Strength 5/5 B/L upper and lower extremities; DTRs 2+ intact and symmetric  CHEST/LUNG: Clear to auscultation bilaterally; No rales, rhonchi, wheezing, or rubs  HEART: Regular rate and rhythm; No murmurs, rubs, or gallops  ABDOMEN: Soft, Nontender, Nondistended; Bowel sounds present  EXTREMITIES:  2+ Peripheral Pulses, No clubbing, cyanosis, or edema ACCEPTANCE NOTE:  Pt downgraded from ICU to med floor    CHIEF COMPLAINT/INTERVAL HISTORY:    Patient is a 20y old  Female who presents with a chief complaint of Acute hypoxic respiratory failure, status epilepticus (26 Aug 2020 16:13)      HPI:  Pt is a 19 y/o female with PMHx of Depression, Migraines, prior Suicide attempt by NSAID OD in 2018 presents after being found unresponsive by father at home earlier today. Pt reportedly found by father in her bedroom this AM, unresponsive, diaphoretic.  Per pt's father known history of drug use, no pill bottles or needles in surrounding area. EMS was called, en route, pt developed decorticate posturing and shortly after had 2 witnessed seizures, s/p IV Versed. In ED, pt with further generalized tonic clonic activity, s/p IV Ativan, but remained largely unresponsive. Pt was subsequently intubated for airway protection, started on propofol gtt. Stat CT head negative for acute intracranial pathology. ICU consulted.     Seen and examined in ED. Pt on propofol gtt, unresponsive, mild posturing with "shaking", appeared almost like shivering/rigors. On VAC 16/400/40/5. Normotensive, mild sinus tach to low 100's, saturating well. (25 Aug 2020 10:42)      SUBJECTIVE & OBJECTIVE/ ROS: Pt seen and examined at bedside. Patient appears comfortable on RA. c/o ringing in the ears but no hearing loss or ear pain. Hx of salicylate overdose in the past. No chest pain, palpitations, sob, light headedness/dizziness, difficulty breathing/cough, fevers/chills, abdominal pain, n/v, diarrhea/constipation, dysuria or increased urinary frequency.     ICU Vital Signs Last 24 Hrs  T(C): 37.3 (26 Aug 2020 15:46), Max: 37.3 (26 Aug 2020 15:46)  T(F): 99.2 (26 Aug 2020 15:46), Max: 99.2 (26 Aug 2020 15:46)  HR: 96 (26 Aug 2020 15:46) (86 - 105)  BP: 136/85 (26 Aug 2020 15:46) (100/66 - 136/85)  BP(mean): 94 (26 Aug 2020 15:00) (78 - 100)  ABP: --  ABP(mean): --  RR: 20 (26 Aug 2020 15:46) (15 - 46)  SpO2: 97% (26 Aug 2020 15:46) (91% - 100%)      REVIEW OF SYSTEMS:    CONSTITUTIONAL: No weakness, fevers or chills  EYES/ENT: No visual changes;  No vertigo or throat pain   NECK: No pain or stiffness  RESPIRATORY: No cough, wheezing, hemoptysis; No shortness of breath  CARDIOVASCULAR: No chest pain or palpitations  GASTROINTESTINAL: No abdominal or epigastric pain. No nausea, vomiting, or hematemesis; No diarrhea or constipation. No melena or hematochezia.  GENITOURINARY: No dysuria, frequency or hematuria  NEUROLOGICAL: No numbness or weakness  SKIN: No itching, rashes        MEDICATIONS  (STANDING):  chlorhexidine 2% Cloths 1 Application(s) Topical <User Schedule>  enoxaparin Injectable 40 milliGRAM(s) SubCutaneous daily    MEDICATIONS  (PRN):  LORazepam   Injectable 1 milliGRAM(s) IV Push every 4 hours PRN seizure      LABS:                        12.1   14.98 )-----------( 285      ( 26 Aug 2020 05:15 )             35.6     08-26    140  |  108<H>  |  4.0<L>  ----------------------------<  108<H>  3.7   |  21.0<L>  |  0.62    Ca    8.2<L>      26 Aug 2020 05:15  Phos  3.2     08-26  Mg     1.9     -    TPro  6.6  /  Alb  3.7  /  TBili  0.2<L>  /  DBili  x   /  AST  25  /  ALT  20  /  AlkPhos  44  08-25    PT/INR - ( 25 Aug 2020 09:21 )   PT: 11.5 sec;   INR: 0.99 ratio         PTT - ( 25 Aug 2020 09:21 )  PTT:18.8 sec  Urinalysis Basic - ( 25 Aug 2020 11:03 )    Color: Yellow / Appearance: Clear / S.030 / pH: x  Gluc: x / Ketone: Trace  / Bili: Negative / Urobili: Negative mg/dL   Blood: x / Protein: 30 mg/dL / Nitrite: Negative   Leuk Esterase: Negative / RBC: 6-10 /HPF / WBC Negative   Sq Epi: x / Non Sq Epi: Occasional / Bacteria: Occasional        CAPILLARY BLOOD GLUCOSE          RECENT CULTURES:        08-25-20 @ 07:01  -  20 @ 07:00  --------------------------------------------------------  IN: 1571.5 mL / OUT: 870 mL / NET: 701.5 mL          RADIOLOGY & ADDITIONAL TESTS:      PHYSICAL EXAM:    GENERAL: NAD, well-groomed, well-developed  HEAD:  Atraumatic, Normocephalic  EYES: EOMI, PERRLA, conjunctiva and sclera clear  ENMT: Moist mucous membranes  NECK: Supple, No JVD  NERVOUS SYSTEM:  Alert & Oriented X3, Motor Strength 5/5 B/L upper and lower extremities; DTRs 2+ intact and symmetric  CHEST/LUNG: Clear to auscultation bilaterally; No rales, rhonchi, wheezing, or rubs  HEART: Regular rate and rhythm; No murmurs, rubs, or gallops  ABDOMEN: Soft, Nontender, Nondistended; Bowel sounds present  EXTREMITIES:  2+ Peripheral Pulses, No clubbing, cyanosis, or edema  Psych: appears calm

## 2020-08-26 NOTE — PROGRESS NOTE ADULT - SUBJECTIVE AND OBJECTIVE BOX
[facility]                                        Neurology at [place]                                  [facility]                                      [address]                                           [place], [place], [address]                                                [phone]        CC: Seizure/status epilepticus.     HISTORY:  The patient is a 20y Female with no reported seizure history now presenting after being found unresponsive at home. EMS was called. While en route to the ER she developed what was described as decorticate posturing (flexor posturing) and had witnessed seizures in the ambulance. She received IV Versed. In the ER she had further seizures. She was given Ativan and ultimately intubated and started on propofol. She also received Keppra.   She has a prior history of suicide attempt secondary to overdose. (JW)    Interval history: no further seizures    ROS neurology: Denies headache or dizziness. Denies weakness/numbness.  Denies speech/language deficits. Denies diplopia/blurred vision.  Denies confusion.  No seizures    MEDICATIONS  (STANDING):  chlorhexidine 2% Cloths 1 Application(s) Topical <User Schedule>  enoxaparin Injectable 40 milliGRAM(s) SubCutaneous daily    MEDICATIONS  (PRN):  LORazepam   Injectable 1 milliGRAM(s) IV Push every 4 hours PRN seizure      Vital Signs Last 24 Hrs  T(C): 37.3 (26 Aug 2020 15:46), Max: 37.3 (26 Aug 2020 15:46)  T(F): 99.2 (26 Aug 2020 15:46), Max: 99.2 (26 Aug 2020 15:46)  HR: 96 (26 Aug 2020 15:46) (86 - 105)  BP: 136/85 (26 Aug 2020 15:46) (100/66 - 136/85)  BP(mean): 94 (26 Aug 2020 15:00) (78 - 100)  RR: 20 (26 Aug 2020 15:46) (15 - 46)  SpO2: 97% (26 Aug 2020 15:46) (91% - 100%)    Detailed Neurologic Exam:    Mental status: The patient is awake and alert and has normal attention span.  The patient is fully oriented in 3 spheres. The patient is oriented to current events. The patient is able to name objects, follow commands, repeat sentences.    Cranial nerves: Pupils equal and react symmetrically to light. There is no visual field deficit to confrontation. Extraocular motion is full with no nystagmus. There is no ptosis. Facial sensation is intact. Facial musculature is symmetric. Palate elevates symmetrically. Shoulder shrug is normal. Tongue is midline.    Motor: There is normal bulk and tone.  There is no tremor.  Strength is 5/5 in the right arm and leg.   Strength is 5/5 in the left arm and leg.    Sensation: Intact to light touch and pin in 4 extremities    Reflexes: 2+ throughout and plantar responses are flexor.    Cerebellar: There is no dysmetria on finger to nose testing.    Gait : deferred    LABS:                           12.1   14.98 )-----------( 285      ( 26 Aug 2020 05:15 )             35.6     08-26    140  |  108<H>  |  4.0<L>  ----------------------------<  108<H>  3.7   |  21.0<L>  |  0.62    Ca    8.2<L>      26 Aug 2020 05:15  Phos  3.2     08-26  Mg     1.9     08-26    TPro  6.6  /  Alb  3.7  /  TBili  0.2<L>  /  DBili  x   /  AST  25  /  ALT  20  /  AlkPhos  44  08-25    LIVER FUNCTIONS - ( 25 Aug 2020 12:57 )  Alb: 3.7 g/dL / Pro: 6.6 g/dL / ALK PHOS: 44 U/L / ALT: 20 U/L / AST: 25 U/L / GGT: x           PT/INR - ( 25 Aug 2020 09:21 )   PT: 11.5 sec;   INR: 0.99 ratio         PTT - ( 25 Aug 2020 09:21 )  PTT:18.8 sec       _____________________________________________________________  EEG SUMMARY/CLASSIFICATION  8/25-26/2020    Abnormal EEG in the awake, drowsy and asleep states.  - Moderate generalized slowing.  - Diffuse excess beta activity.    _____________________________________________________________  EEG IMPRESSION/CLINICAL CORRELATE    Abnormal EEG study.  1. Moderate nonspecific diffuse or multifocal cerebral dysfunction.   2. Diffuse excess beta activity may be seen with medication use such as benzodiazepines or barbiturates.  3. No epileptiform pattern or seizure seen.    _____________________________________________________________    RADIOLOGY  CT head images reviewed (and concur with report): There is no acute pathology.

## 2020-08-26 NOTE — PROGRESS NOTE ADULT - ASSESSMENT
21 y/o female with PMHx of Depression, Migraines, prior Suicide attempt by NSAID OD in 2018 presents after being found unresponsive by father at home earlier today. Pt reportedly found by father in her bedroom this AM, unresponsive, diaphoretic.  Per pt's father known history of drug use, no pill bottles or needles in surrounding area. EMS was called, en route, pt developed decorticate posturing and shortly after had 2 witnessed seizures, s/p IV Versed. In ED, pt with further generalized tonic clonic activity, s/p IV Ativan, but remained largely unresponsive. Pt was subsequently intubated for airway protection, started on propofol gtt. Stat CT head negative for acute intracranial pathology. Pt now extubated on RA. Downgraded from ICU to med floor      Possible toxic ingestion/ Drug abuse/OD causing Acute hypoxic respiratory failure, resolved  Acute metabolic Encephalopathy due to Seizures from BZ overdose  Mentation appears to be at baseline.   Pt does admit to wanting to hurt herself but says she is remorseful.   Woody consulted.   On 1:1 observation.   No seizures seen on EEG.  Likely cause of seizure is BZ overdose & withdrawal  She has no prior history of seizure  Will discontinue Keppra per neuro  cEEG did not show seizures or epilepitform activity   CTH normal.   s/p extubation saturating well on RA   leukocytosis liekly reactive       DVT ppx w/ Lovenox 21 y/o female with PMHx of Depression, Migraines, prior Suicide attempt by NSAID OD in 2018 presents after being found unresponsive by father at home earlier today. Pt reportedly found by father in her bedroom this AM, unresponsive, diaphoretic.  Per pt's father known history of drug use, no pill bottles or needles in surrounding area. EMS was called, en route, pt developed decorticate posturing and shortly after had 2 witnessed seizures, s/p IV Versed. In ED, pt with further generalized tonic clonic activity, s/p IV Ativan, but remained largely unresponsive. Pt was subsequently intubated for airway protection, started on propofol gtt. Stat CT head negative for acute intracranial pathology. Pt now extubated on RA. Downgraded from ICU to med floor         Acute metabolic Encephalopathy due to Seizures from BZ overdose/ withdrawal causing Acute hypoxic respiratory failure, now resolved  Mentation appears to be at baseline. Pt is AAO x 3  Pt does admit to wanting to hurt herself but says she is remorseful.  Woody consulted.   On 1:1 observation.   No seizures seen on EEG.  Likely cause of seizure is BZ overdose & withdrawal  She has no prior history of seizure  Will discontinue Keppra per neuro  Ativan prn  cEEG did not show seizures or epilepitform activity   CTH normal.   s/p extubation saturating well on RA     Depression-  Pt stopped taking her antidepressants since 6 months (did not think she needed them). Started having suicidal thoughts few days ago  Psych consult called  c/w 1:1 obs      Leukocytosis- likely reactive.   Afebrile  Will monitor      DVT ppx w/ Lovenox

## 2020-08-26 NOTE — EEG REPORT - NS EEG TEXT BOX
[facility]   COMPREHENSIVE EPILEPSY CENTER   REPORT OF LONG-TERM VIDEO EEG     [facility]: [address], [place] [address], Ph#: [phone]  [facility]: [id number], [place] [address], Ph#: [phone]  [facility]: [address] [place] [address], Ph#: [phone]    Patient Name: GUERA SHAFFER  Age and : 20y (00)  MRN #: [id number]  Location: [place] [address]  Referring Physician: Gordy Kong    Start Time/Date: 14:50 on 20  End Time/Date: 08:00 on 20  Duration: 17hr 10m    _____________________________________________________________  STUDY INFORMATION    EEG Recording Technique:  The patient underwent continuous Video-EEG monitoring, using Telemetry System hardware on the XLTek Digital System. EEG and video data were stored on a computer hard drive with important events saved in digital archive files. The material was reviewed by a physician (electroencephalographer / epileptologist) on a daily basis. Jerome and seizure detection algorithms were utilized and reviewed. An EEG Technician attended to the patient, and was available throughout daytime work hours.  The epilepsy center neurologist was available in person or on call 24-hours per day.    EEG Placement and Labeling of Electrodes:  The EEG was performed utilizing 20 channel referential EEG connections (coronal over temporal over parasagittal montage) using all standard 10-20 electrode placements with EKG, with additional electrodes placed in the inferior temporal region using the modified 10-10 montage electrode placements for elective admissions, or if deemed necessary. Recording was at a sampling rate of 256 samples per second per channel. Time synchronized digital video recording was done simultaneously with EEG recording. A low light infrared camera was used for low light recording.     _____________________________________________________________  HISTORY    Patient is a 20y old  Female who presents with a chief complaint of Acute hypoxic respiratory failure, status epilepticus (25 Aug 2020 18:28)      PERTINENT MEDICATION:  levETIRAcetam  IVPB 1000 milliGRAM(s) IV Intermittent every 12 hours    _____________________________________________________________  STUDY INTERPRETATION    Findings: The background was continuous, spontaneously variable and reactive. No posterior dominant rhythm seen.    Background Slowing:  Background predominantly consisted of theta, delta and faster activities.    Focal Slowing:   None were present.    Sleep Background:  Drowsiness was characterized by fragmentation, attenuation, and slowing of the background activity.    Sleep was characterized by the presence of vertex waves, symmetric spindles and K-complexes.    Other Non-Epileptiform Findings:  Diffuse excess beta activity.    Interictal Epileptiform Activity:   None were present.    Events:  Clinical events: None recorded.  Seizures: None recorded.    Activation Procedures:   Hyperventilation was not performed.    Photic stimulation was not performed.     Artifacts:  Intermittent myogenic and movement artifacts were noted.    ECG:  The heart rate on single channel ECG was predominantly between 80-90BPM.    _____________________________________________________________  EEG SUMMARY/CLASSIFICATION    Abnormal EEG in the awake, drowsy and asleep states.  - Moderate generalized slowing.  - Diffuse excess beta activity.    _____________________________________________________________  EEG IMPRESSION/CLINICAL CORRELATE    Abnormal EEG study.  1. Moderate nonspecific diffuse or multifocal cerebral dysfunction.   2. Diffuse excess beta activity may be seen with medication use such as benzodiazepines or barbiturates.  3. No epileptiform pattern or seizure seen.    _____________________________________________________________    Alicia Daniels MD  Attending Physician, [facility]

## 2020-08-26 NOTE — PROGRESS NOTE ADULT - ASSESSMENT
The patient is a 20y Female with new onset seizure.     Seizure.   Patient admitted to intentionally taking pills that were previously prescribed to her for anxiety and depression  This is likely cause of seizure  She has no prior history of seizure  Will discontinue Keppra   cEEG did not show seizures or epilepitform activity    Depression  Stated this was an intentional ingestion of excessive pills, said it was impulsive  Has prior suicide attempt.   I recommend consulting the Behavioral Health team.     No further neurological recommendations at this time    Thank you for allowing me to participate in the care of your patient    Nixon Boyle MD, PhD   [id number]

## 2020-08-27 ENCOUNTER — TRANSCRIPTION ENCOUNTER (OUTPATIENT)
Age: 20
End: 2020-08-27

## 2020-08-27 VITALS
DIASTOLIC BLOOD PRESSURE: 83 MMHG | TEMPERATURE: 99 F | OXYGEN SATURATION: 97 % | HEART RATE: 96 BPM | SYSTOLIC BLOOD PRESSURE: 119 MMHG | RESPIRATION RATE: 18 BRPM

## 2020-08-27 DIAGNOSIS — F05 DELIRIUM DUE TO KNOWN PHYSIOLOGICAL CONDITION: ICD-10-CM

## 2020-08-27 PROCEDURE — 80307 DRUG TEST PRSMV CHEM ANLYZR: CPT

## 2020-08-27 PROCEDURE — 84132 ASSAY OF SERUM POTASSIUM: CPT

## 2020-08-27 PROCEDURE — 86850 RBC ANTIBODY SCREEN: CPT

## 2020-08-27 PROCEDURE — 70450 CT HEAD/BRAIN W/O DYE: CPT

## 2020-08-27 PROCEDURE — 36415 COLL VENOUS BLD VENIPUNCTURE: CPT

## 2020-08-27 PROCEDURE — 82330 ASSAY OF CALCIUM: CPT

## 2020-08-27 PROCEDURE — 71045 X-RAY EXAM CHEST 1 VIEW: CPT

## 2020-08-27 PROCEDURE — 80048 BASIC METABOLIC PNL TOTAL CA: CPT

## 2020-08-27 PROCEDURE — 85014 HEMATOCRIT: CPT

## 2020-08-27 PROCEDURE — 95711 VEEG 2-12 HR UNMONITORED: CPT

## 2020-08-27 PROCEDURE — 86769 SARS-COV-2 COVID-19 ANTIBODY: CPT

## 2020-08-27 PROCEDURE — 96374 THER/PROPH/DIAG INJ IV PUSH: CPT | Mod: XU

## 2020-08-27 PROCEDURE — 87635 SARS-COV-2 COVID-19 AMP PRB: CPT

## 2020-08-27 PROCEDURE — 82553 CREATINE MB FRACTION: CPT

## 2020-08-27 PROCEDURE — 99291 CRITICAL CARE FIRST HOUR: CPT | Mod: 25

## 2020-08-27 PROCEDURE — 99239 HOSP IP/OBS DSCHRG MGMT >30: CPT

## 2020-08-27 PROCEDURE — 84145 PROCALCITONIN (PCT): CPT

## 2020-08-27 PROCEDURE — 86901 BLOOD TYPING SEROLOGIC RH(D): CPT

## 2020-08-27 PROCEDURE — 82803 BLOOD GASES ANY COMBINATION: CPT

## 2020-08-27 PROCEDURE — 36600 WITHDRAWAL OF ARTERIAL BLOOD: CPT

## 2020-08-27 PROCEDURE — 80053 COMPREHEN METABOLIC PANEL: CPT

## 2020-08-27 PROCEDURE — 80201 ASSAY OF TOPIRAMATE: CPT

## 2020-08-27 PROCEDURE — T1013: CPT

## 2020-08-27 PROCEDURE — 87040 BLOOD CULTURE FOR BACTERIA: CPT

## 2020-08-27 PROCEDURE — 82962 GLUCOSE BLOOD TEST: CPT

## 2020-08-27 PROCEDURE — 95716 VEEG EA 12-26HR CONT MNTR: CPT

## 2020-08-27 PROCEDURE — 84702 CHORIONIC GONADOTROPIN TEST: CPT

## 2020-08-27 PROCEDURE — 84295 ASSAY OF SERUM SODIUM: CPT

## 2020-08-27 PROCEDURE — 85027 COMPLETE CBC AUTOMATED: CPT

## 2020-08-27 PROCEDURE — 85610 PROTHROMBIN TIME: CPT

## 2020-08-27 PROCEDURE — 82947 ASSAY GLUCOSE BLOOD QUANT: CPT

## 2020-08-27 PROCEDURE — 82435 ASSAY OF BLOOD CHLORIDE: CPT

## 2020-08-27 PROCEDURE — 83735 ASSAY OF MAGNESIUM: CPT

## 2020-08-27 PROCEDURE — 99223 1ST HOSP IP/OBS HIGH 75: CPT

## 2020-08-27 PROCEDURE — 31500 INSERT EMERGENCY AIRWAY: CPT

## 2020-08-27 PROCEDURE — 82550 ASSAY OF CK (CPK): CPT

## 2020-08-27 PROCEDURE — 94002 VENT MGMT INPAT INIT DAY: CPT

## 2020-08-27 PROCEDURE — 84484 ASSAY OF TROPONIN QUANT: CPT

## 2020-08-27 PROCEDURE — 85730 THROMBOPLASTIN TIME PARTIAL: CPT

## 2020-08-27 PROCEDURE — 84100 ASSAY OF PHOSPHORUS: CPT

## 2020-08-27 PROCEDURE — 81001 URINALYSIS AUTO W/SCOPE: CPT

## 2020-08-27 PROCEDURE — 83605 ASSAY OF LACTIC ACID: CPT

## 2020-08-27 PROCEDURE — 86900 BLOOD TYPING SEROLOGIC ABO: CPT

## 2020-08-27 RX ADMIN — CHLORHEXIDINE GLUCONATE 1 APPLICATION(S): 213 SOLUTION TOPICAL at 06:00

## 2020-08-27 RX ADMIN — ENOXAPARIN SODIUM 40 MILLIGRAM(S): 100 INJECTION SUBCUTANEOUS at 12:31

## 2020-08-27 NOTE — DISCHARGE NOTE PROVIDER - NSDCCPCAREPLAN_GEN_ALL_CORE_FT
PRINCIPAL DISCHARGE DIAGNOSIS  Diagnosis: Seizure  Assessment and Plan of Treatment: secondary to wellbutyrin, please do not take this medication it can lower seizure threshold. Please abstain from medication abuse and misuse. Please abstain from drug use.      SECONDARY DISCHARGE DIAGNOSES  Diagnosis: Depression  Assessment and Plan of Treatment: Please follow up with the psychiatrist and therapist for further managemetn and care. Please continue with therapy counselling.

## 2020-08-27 NOTE — BEHAVIORAL HEALTH ASSESSMENT NOTE - NSBHCHARTREVIEWVS_PSY_A_CORE FT
Vital Signs Last 24 Hrs  T(C): 37.2 (27 Aug 2020 08:14), Max: 37.3 (26 Aug 2020 15:46)  T(F): 99 (27 Aug 2020 08:14), Max: 99.2 (26 Aug 2020 15:46)  HR: 81 (27 Aug 2020 08:14) (79 - 96)  BP: 113/71 (27 Aug 2020 08:14) (104/65 - 136/85)  BP(mean): --  RR: 19 (27 Aug 2020 08:14) (18 - 20)  SpO2: 97% (27 Aug 2020 06:03) (97% - 98%)
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
## 2020-08-27 NOTE — DISCHARGE NOTE PROVIDER - HOSPITAL COURSE
19 y/o female with Hx of Depression, Migraines, prior Suicide attempt by NSAID OD in 2018 presents after being found unresponsive by father at home. Pt reportedly found by father in her bedroom this AM, unresponsive, diaphoretic.  Per pt's father known history of drug use, no pill bottles or needles in surrounding area. EMS was called, en route, pt developed decorticate posturing and shortly after had 2 witnessed seizures, s/p IV Versed. In ED, pt with further generalized tonic clonic activity, s/p IV Ativan, but remained largely unresponsive. Pt was subsequently intubated for airway protection, started on propofol gtt. Stat CT head negative for acute intracranial pathology. Pt thereafter extubated, negative cxr and remained stable on room air. Downgraded from ICU to med floor without any complications. Suspected suicidal ideation for which the pt was placed on constant observation, pt currently denied suicidal ideation was evaluated by psych and cleared for dispo. Likely pt took wellbutyrin which lowered her seizure threshold and predisposed her to seizure like activity. Evaluated by neurology s.p negative eeg and cleared by neuro for dispo. Pt medically stable to be discharged home. Pt recommended to f/u with psych outpt/ therpaist. Pt is amenable to this. Psych discussed with the patients father the plan of care. Pt counselled extensively on abstinence from drug use/ and pill misuse. Pt also recommended to avoid wellbutyrin use.                 Vital Signs Last 24 Hrs    T(C): 37.2 (27 Aug 2020 08:14), Max: 37.3 (26 Aug 2020 15:46)    T(F): 99 (27 Aug 2020 08:14), Max: 99.2 (26 Aug 2020 15:46)    HR: 81 (27 Aug 2020 08:14) (79 - 102)    BP: 113/71 (27 Aug 2020 08:14) (104/65 - 136/85)    BP(mean): 94 (26 Aug 2020 15:00) (94 - 94)    RR: 19 (27 Aug 2020 08:14) (18 - 26)    SpO2: 97% (27 Aug 2020 06:03) (97% - 98%)            CONSTITUTIONAL: Well appearing, well nourished, awake, alert and in no apparent distress    CARDIAC: Normal rate, regular rhythm.  Heart sounds S1, S2.  No murmurs, rubs or gallops     RESPIRATORY: Breath sounds clear and equal bilaterally. No wheezes, rhales or rhonchi    GASTROENTEROLOGIST: soft nt nd bs + normoactive     EXTREMITIES: No edema, cyanosis or deformity     NEUROLOGICAL: Alert and oriented, no focal deficits, no motor or sensory deficits.    SKIN: No rash, skin turgor wnl    Psych: Fair affect, no suicidal ideation             Discharge planning took 46 minutes
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
## 2020-08-27 NOTE — BEHAVIORAL HEALTH ASSESSMENT NOTE - HPI (INCLUDE ILLNESS QUALITY, SEVERITY, DURATION, TIMING, CONTEXT, MODIFYING FACTORS, ASSOCIATED SIGNS AND SYMPTOMS)
Patient is a 20 year old, female; domiciled with mother, father, two brothers (age 9, and 24), sister and brother's gf,  single; noncaregiver; works as teacher for toddlers, with  past psychiatric history of depression; two prior psychiatric  hospitalizations (at age 16 and 17); one  known suicide attempts (prior to first hospitalizations at age 16 by overdose), with h/o self cutting;  no known history of violence or arrests; no active substance abuse or known history of complicated withdrawal; with Past medical history of migraines, presented following a witnessed seizure. Asked to evaluate for depression and possible suicidal ideation.     Patient with 1:1 at bedside.  Patient was sitting in bed, well groomed with full affect.  Patient reports she does not remember events that led to hospitalization. She does not remember any particular stressor.  She reported that last thing she remembers is texting her friends.  He denies that she had been feeling depressed and she reports she was texting friends about mundane matters.  She denies remembering taking any medications. She does states that she does have Wellubutrin at home but has not taken medications for about 10 months and has not followed up with outpatient therapist for about 6 months. She reports she has been busy with her work.  She denies feeling particularly depressed. She denies any disturbance in sleep or appetite. She reports her mood is currently good. She denies any S/H I/I/P. She does have interest in re engaging with therapist.     Concerning other psychiatric symptoms, pt denies depressed mood, and  continues to enjoy  pleasurable activities. Pt denies any suicidal ideation, intent or plan as well as any aggressive or violent behavior towards others. Pt denies any episodes of bizarre happiness, unusual energy, unusual nightime excitation or other common symptoms of carolee. Pt denies hearing voices or seeing things.  No delusions were elicited.  Patient denies pervasive anxiety,  panic attacks, obsessions or compulsions.  She reports h/o panic attacks in the past but not currently. She feels safe returning home. She has not exhibited any agitation or aggression on unit. She has been followed by neurology. No seizure focus was found on EEG.  Neurology felt epilepsy may be related to substance ingestion.     Spoke with father who corroborated above.   She stated that son's girfriend heard noise of patient falling and when he investigated he saw her seizing and 911 was called. He had not noted patient to be particularly depressed.  She is re starting work this monday. He did not note any disturbance in sleep or appetite.  He does report that he had concerns initially given her past history but did not find any empty bottles or any evidence that patient had taken excessive pills.  He feels comfortable with patient's discharge back home. He states he and wife are always home and plan to keep a closer eye on patient just in case.  He does feel it would be helpful for patient to return to therapist which patient also agreed she would like to do.

## 2020-08-27 NOTE — DISCHARGE NOTE PROVIDER - NSDCFUADDAPPT_GEN_ALL_CORE_FT
[FreeTextEntry2] : Hormone therapy for prostate cancer [FreeTextEntry3] : 1st lupron shot, 22.5mg IM given on right buttock. Lot # [id number], Exp date 3/23/2022 Please follow up with your primary care physician in 3-5 days. Please follow up with psychiatrist in 1 week.

## 2020-08-27 NOTE — BEHAVIORAL HEALTH ASSESSMENT NOTE - SUICIDE PROTECTIVE FACTORS
Responsibility to family and others/Identifies reasons for living/Supportive social network of family or friends/Has future plans

## 2020-08-27 NOTE — BEHAVIORAL HEALTH ASSESSMENT NOTE - RISK ASSESSMENT
Low Acute Suicide Risk Patient denies any S/H I/I/P, no evidence of current suicidal intent, no psychotic sx's, full affect, remains future oriented with plans and good support.

## 2020-08-27 NOTE — DISCHARGE NOTE NURSING/CASE MANAGEMENT/SOCIAL WORK - PATIENT PORTAL LINK FT
You can access the FollowMyHealth Patient Portal offered by [facility] by registering at the following website: [email or web address]. By joining [employer]’s FollowMyHealth portal, you will also be able to view your health information using other applications (apps) compatible with our system.

## 2020-08-27 NOTE — BEHAVIORAL HEALTH ASSESSMENT NOTE - SUMMARY
Patient is a 20 year old, female; domiciled with mother, father, two brothers (age 9, and 24), sister and brother's gf,  single; noncaregiver; works as teacher for toddlers, with  past psychiatric history of depression; two prior psychiatric  hospitalizations (at age 16 and 17); one  known suicide attempts (prior to first hospitalizations at age 16 by overdose), with h/o self cutting;  no known history of violence or arrests; no active substance abuse or known history of complicated withdrawal; with Past medical history of migraines, presented following a witnessed seizure. Asked to evaluate for depression and possible suicidal ideation.  No evidence of depressive sx's or suicidal intent. Collateral obtained form father.  No evidence on sight of empty vials or that she took excessive medications. EEG was unremarkable. Would avoid restarting Wellbutrin as may lower seizure threshold. Will clear for outpatient follow up.  Would refer back to outpatient therapy.

## 2020-08-27 NOTE — DISCHARGE NOTE NURSING/CASE MANAGEMENT/SOCIAL WORK - NSDCFUADDAPPT_GEN_ALL_CORE_FT
Please follow up with your primary care physician in 3-5 days. Please follow up with psychiatrist in 1 week.

## 2020-08-28 LAB — TOPIRAMATE SERPL-MCNC: <1 MCG/ML — SIGNIFICANT CHANGE UP

## 2020-08-30 LAB
CULTURE RESULTS: SIGNIFICANT CHANGE UP
SPECIMEN SOURCE: SIGNIFICANT CHANGE UP

## 2020-10-09 PROBLEM — F32.9 MAJOR DEPRESSIVE DISORDER, SINGLE EPISODE, UNSPECIFIED: Chronic | Status: ACTIVE | Noted: 2020-08-25

## 2020-10-09 PROBLEM — G43.909 MIGRAINE, UNSPECIFIED, NOT INTRACTABLE, WITHOUT STATUS MIGRAINOSUS: Chronic | Status: ACTIVE | Noted: 2020-08-25

## 2020-12-03 ENCOUNTER — APPOINTMENT (OUTPATIENT)
Dept: NEUROLOGY | Facility: CLINIC | Age: 20
End: 2020-12-03

## 2021-01-05 ENCOUNTER — APPOINTMENT (OUTPATIENT)
Dept: NEUROLOGY | Facility: CLINIC | Age: 21
End: 2021-01-05
Payer: MEDICAID

## 2021-01-05 VITALS
SYSTOLIC BLOOD PRESSURE: 110 MMHG | DIASTOLIC BLOOD PRESSURE: 70 MMHG | BODY MASS INDEX: 23.9 KG/M2 | TEMPERATURE: 97.1 F | WEIGHT: 140 LBS | HEIGHT: 64 IN

## 2021-01-05 DIAGNOSIS — R56.9 UNSPECIFIED CONVULSIONS: ICD-10-CM

## 2021-01-05 DIAGNOSIS — Z86.59 PERSONAL HISTORY OF OTHER MENTAL AND BEHAVIORAL DISORDERS: ICD-10-CM

## 2021-01-05 DIAGNOSIS — G44.89 OTHER HEADACHE SYNDROME: ICD-10-CM

## 2021-01-05 DIAGNOSIS — F19.90 OTHER PSYCHOACTIVE SUBSTANCE USE, UNSPECIFIED, UNCOMPLICATED: ICD-10-CM

## 2021-01-05 PROCEDURE — 99214 OFFICE O/P EST MOD 30 MIN: CPT

## 2021-01-05 PROCEDURE — 99072 ADDL SUPL MATRL&STAF TM PHE: CPT

## 2021-01-05 RX ORDER — NORETHINDRONE ACETATE AND ETHINYL ESTRADIOL 1; 20 MG/1; UG/1
TABLET ORAL
Refills: 0 | Status: ACTIVE | COMMUNITY
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
## 2021-01-05 NOTE — PHYSICAL EXAM
[General Appearance - Alert] : alert [General Appearance - In No Acute Distress] : in no acute distress [Oriented To Time, Place, And Person] : oriented to person, place, and time [Affect] : the affect was normal [Memory Remote] : remote memory was not impaired [Cranial Nerves Optic (II)] : visual acuity intact bilaterally,  visual fields full to confrontation, pupils equal round and reactive to light [Cranial Nerves Oculomotor (III)] : extraocular motion intact [Cranial Nerves Trigeminal (V)] : facial sensation intact symmetrically [Cranial Nerves Facial (VII)] : face symmetrical [Cranial Nerves Vestibulocochlear (VIII)] : hearing was intact bilaterally [Cranial Nerves Glossopharyngeal (IX)] : tongue and palate midline [Cranial Nerves Accessory (XI - Cranial And Spinal)] : head turning and shoulder shrug symmetric [Cranial Nerves Hypoglossal (XII)] : there was no tongue deviation with protrusion [Motor Tone] : muscle tone was normal in all four extremities [Motor Strength] : muscle strength was normal in all four extremities [Sensation Tactile Decrease] : light touch was intact [Sensation Pain / Temperature Decrease] : pain and temperature was intact [Sensation Vibration Decrease] : vibration was intact [Abnormal Walk] : normal gait [2+] : Patella left 2+ [Optic Disc Abnormality] : the optic disc were normal in size and color [Dysarthria] : no dysarthria [Aphasia] : no dysphasia/aphasia [Romberg's Sign] : Romberg's sign was negtive [Coordination - Dysmetria Impaired Finger-to-Nose Bilateral] : not present [Plantar Reflex Right Only] : normal on the right [Plantar Reflex Left Only] : normal on the left

## 2021-01-05 NOTE — CONSULT LETTER
[Dear  ___] : Dear  [unfilled], [Courtesy Letter:] : I had the pleasure of seeing your patient, [unfilled], in my office today. [Please see my note below.] : Please see my note below. [Consult Closing:] : Thank you very much for allowing me to participate in the care of this patient.  If you have any questions, please do not hesitate to contact me. [Sincerely,] : Sincerely, [FreeTextEntry3] : Jose Luis Parks MD.

## 2021-01-05 NOTE — HISTORY OF PRESENT ILLNESS
[FreeTextEntry1] : I saw this patient in the office today.\par \par She had been seen previously in August of 2020 at [facility].\par She had presented after being found unresponsive at home.\par She developed seizure activity and was in the intensive care unit.\par Toxicology was positive for amphetamines and benzodiazepines.\par The seizures were ultimately controlled and she was transferred out of the intensive care.\par Initially she was on Keppra and this was ultimately discontinued.\par \par She has a history of prior hospitalization secondary to suicide attempt from overdose.\par \par She now reports that she continues to have headaches on a daily basis.\par This has been with her for several years.\par She gets severe migraines on the order of once per month.

## 2021-02-01 ENCOUNTER — RX CHANGE (OUTPATIENT)
Age: 21
End: 2021-02-01

## 2021-02-01 RX ORDER — NORTRIPTYLINE HYDROCHLORIDE 25 MG/1
25 CAPSULE ORAL
Qty: 90 | Refills: 2 | Status: ACTIVE | COMMUNITY
Start: 2021-02-01 | End: 1900-01-01

## 2021-02-01 RX ORDER — NORTRIPTYLINE HYDROCHLORIDE 25 MG/1
25 CAPSULE ORAL
Qty: 30 | Refills: 4 | Status: DISCONTINUED | COMMUNITY
Start: 2021-01-05 | End: 2021-02-01

## 2021-04-26 ENCOUNTER — TRANSCRIPTION ENCOUNTER (OUTPATIENT)
Age: 21
End: 2021-04-26

## 2021-05-06 ENCOUNTER — APPOINTMENT (OUTPATIENT)
Dept: NEUROLOGY | Facility: CLINIC | Age: 21
End: 2021-05-06

## 2021-05-17 ENCOUNTER — TRANSCRIPTION ENCOUNTER (OUTPATIENT)
Age: 21
End: 2021-05-17

## 2022-04-15 NOTE — BEHAVIORAL HEALTH ASSESSMENT NOTE - DESCRIPTION
Called mom to convey below message from Dr. Hercules. Mom verbalized understanding and had no questions.   Mom transferred to  to schedule appointment.    h/o migraines

## 2022-05-25 NOTE — ED ADULT TRIAGE NOTE - WEIGHT IN KG
Patient sees Dr Tommy Fontana Rosalinda Libman is calling in from [address] [place] [address] Dr [phone] 63.5

## 2022-06-20 NOTE — ED BEHAVIORAL HEALTH ASSESSMENT NOTE - FAMILY DETAILS
Detail Level: Detailed parents Sunscreen Recommendations: Neutrogena zinc and titanium sunscreen Detail Level: Zone

## 2023-02-10 NOTE — ED BEHAVIORAL HEALTH ASSESSMENT NOTE - NS ED BHA PLAN HANDOFF TO INPATIENT PROVIDER YESNO
Clinic Care Coordination Contact  [facility]/Voicemail       Clinical Data: Care Coordinator Outreach  Outreach attempted x 2.  Left message on patient's voicemail with call back information and requested return call.  Plan: Care Coordinator  via . Care Coordinator will try to reach patient again in 1-2 business days.        
Yes

## 2023-03-30 NOTE — ED BEHAVIORAL HEALTH ASSESSMENT NOTE - SUMMARY
No
20yo female voluntarily seeking admission to inpatient facility but when  when reinforced would go to adult unit as she is over 18 she became upset and ambivalent about plan. Therapist confirming behavior displayed is not atypical for patient. Often states suicidal thoughts with no plan expressed. Noncompliant with medications when she feels better. Father had concerns due to frequency of thoughts and suspected non compliance. Mom less concerned with safety at home as patient frequently has "episodes like this."

## 2023-06-17 ENCOUNTER — NON-APPOINTMENT (OUTPATIENT)
Age: 23
End: 2023-06-17

## 2023-11-24 NOTE — ED ADULT TRIAGE NOTE - NS ED NURSE BANDS TYPE
Name band; Anticipating Rehabilitation to improve strength and balance for return to prior level of function; please follow PT notes for functional assessment

## 2024-01-25 NOTE — ED PROVIDER NOTE - CROS ED GI ALL NEG
RN Cancer Care Coordinator was notified by RNCC at [facility] that she had been contacted for a critical hgb of 7.9. This patient was seen by Dr. Munoz at this clinic (N) for consult in August of 2023.     Plan from Dr. Munoz's note:  I will repeat his labs next week at [facility] including CBC, peripheral smear, haptoglobin, LDH, folic acid, soluble transfer receptor, iron studies reticulocyte count and SHEN. We will arrange for blood transfusion if needed at [facility]. I will see him back in 4 weeks.     He did follow up with lab appt, but did not follow up with returning to see Dr. Munoz. There were standing CBC orders entered under Dr. Munoz at that time.     Writer reviewed chart and could see that Dr. Jimmie Hoyt has been following pt closely recently. There is a comment regarding hgb level from last week. Writer called Maribell, at [facility] lab and explained the situation. She stated that she would page this out to Dr. Hoyt. She recommended canceling standing orders if pt is not following with our provider.     Writer verbalized appreciation for her assistance.    Sandrine Hill, RN   Cancer Care Nurse Coordinator  [facility]  [phone]         - - -

## 2024-02-01 ENCOUNTER — NON-APPOINTMENT (OUTPATIENT)
Age: 24
End: 2024-02-01

## 2024-11-07 NOTE — ED BEHAVIORAL HEALTH ASSESSMENT NOTE - NS ED [facility] MED ROS MUSCULOSKELETAL
[facility] EMERGENCY DEPT  eMERGENCYdEPARTMENT eNCOUnter      Pt Name: Estelle Love  MRN: [id number]  Birthdate 2003  Date of evaluation: 11/6/2024  Provider:ANDREW Bejarano    CHIEF COMPLAINT       Chief Complaint   Patient presents with    Pharyngitis     Enlarged tonsils and difficulty swallowing for a few days         HISTORY OF PRESENT ILLNESS  (Location/Symptom, Timing/Onset, Context/Setting, Quality, Duration, Modifying Factors, Severity.)   Estelle Love is a 21 y.o. female who presents to the emergency department with complaints of sore throat 2-3 day onset tonsilitis concern.     [facility]    Nursing Notes were reviewed and I agree.    REVIEW OF SYSTEMS    (2-9 systems for level 4, 10 or more for level 5)     Review of Systems   Constitutional:  Negative for activity change, appetite change, chills and fever.   HENT:  Positive for congestion and sore throat. Negative for postnasal drip and rhinorrhea.    Eyes:  Negative for photophobia, pain, discharge and visual disturbance.   Respiratory:  Negative for apnea, cough and shortness of breath.    Cardiovascular:  Negative for chest pain and leg swelling.   Gastrointestinal:  Negative for abdominal distention, abdominal pain and nausea.   Genitourinary:  Negative for vaginal bleeding.   Musculoskeletal:  Negative for arthralgias, back pain, joint swelling, neck pain and neck stiffness.   Skin:  Negative for color change and rash.   Neurological:  Negative for dizziness, syncope, facial asymmetry and headaches.   Hematological:  Negative for adenopathy. Does not bruise/bleed easily.   Psychiatric/Behavioral:  Negative for agitation, behavioral problems and confusion.         Except as noted above the remainder of the review of systems was reviewed and negative.       PAST MEDICAL HISTORY   History reviewed. No pertinent past medical history.      SURGICAL HISTORY     History reviewed. No pertinent surgical history.      CURRENT MEDICATIONS       Discharge Medication  No stridor. No wheezing.   Abdominal:      General: Bowel sounds are normal. There is no distension.      Palpations: Abdomen is soft.      Tenderness: There is no abdominal tenderness.   Musculoskeletal:         General: Normal range of motion.      Cervical back: Normal range of motion and neck supple.   Skin:     General: Skin is warm and dry.      Capillary Refill: Capillary refill takes less than 2 seconds.      Findings: No rash.   Neurological:      Mental Status: She is alert and oriented to person, place, and time.      Cranial Nerves: No cranial nerve deficit.      Sensory: No sensory deficit.      Coordination: Coordination normal.   Psychiatric:         Behavior: Behavior normal.         Thought Content: Thought content normal.           DIAGNOSTIC RESULTS     RADIOLOGY:   Non-plain film images such as CT, Ultrasound and MRI are read by the radiologist. Plain radiographic images are visualized and preliminarilyinterpreted by No att. providers found with the below findings:        Interpretation per the Radiologist below, if available at the time of this note:    No orders to display       LABS:  Labs Reviewed   RAPID STREP SCREEN   CULTURE, BETA STREP CONFIRM PLATES       All other labs were within normal range or notreturned as of this dictation.    RE-ASSESSMENT          EMERGENCY DEPARTMENT COURSE and DIFFERENTIAL DIAGNOSIS/MDM:   Vitals:    Vitals:    11/06/24 1812   BP: 114/84   Pulse: 71   Resp: 18   Temp: 98 °F (36.7 °C)   TempSrc: Oral   SpO2: 100%   Weight: 62.1 kg (137 lb)   Height: 1.575 m (5' 2\")           MDM  Spoke with dr cooper ENT agreeable to follow in outpatient setting. Declines monospot test or IM shots. Negative strep.     PROCEDURES:    Procedures      FINAL IMPRESSION      1. Acute tonsillitis, unspecified etiology          DISPOSITION/PLAN   DISPOSITION Decision To Discharge 11/06/2024 07:40:01 PM           PATIENT REFERRED TO:  [facility] EMERGENCY DEPT  [address] [facility]  No complaints

## 2025-01-31 DIAGNOSIS — Z01.419 ENCOUNTER FOR GYNECOLOGICAL EXAMINATION (GENERAL) (ROUTINE) W/OUT ABNORMAL FINDINGS: ICD-10-CM

## 2025-02-03 ENCOUNTER — APPOINTMENT (OUTPATIENT)
Dept: OBGYN | Facility: CLINIC | Age: 25
End: 2025-02-03

## 2025-02-03 ENCOUNTER — APPOINTMENT (OUTPATIENT)
Dept: ANTEPARTUM | Facility: CLINIC | Age: 25
End: 2025-02-03

## 2025-04-04 DIAGNOSIS — Z01.419 ENCOUNTER FOR GYNECOLOGICAL EXAMINATION (GENERAL) (ROUTINE) W/OUT ABNORMAL FINDINGS: ICD-10-CM

## 2025-04-09 ENCOUNTER — APPOINTMENT (OUTPATIENT)
Dept: FAMILY MEDICINE | Facility: CLINIC | Age: 25
End: 2025-04-09
Payer: COMMERCIAL

## 2025-04-09 ENCOUNTER — APPOINTMENT (OUTPATIENT)
Dept: OBGYN | Facility: CLINIC | Age: 25
End: 2025-04-09

## 2025-04-09 VITALS
OXYGEN SATURATION: 98 % | HEIGHT: 64 IN | TEMPERATURE: 98.4 F | BODY MASS INDEX: 31.76 KG/M2 | DIASTOLIC BLOOD PRESSURE: 72 MMHG | SYSTOLIC BLOOD PRESSURE: 118 MMHG | WEIGHT: 186 LBS | HEART RATE: 85 BPM

## 2025-04-09 DIAGNOSIS — R10.30 LOWER ABDOMINAL PAIN, UNSPECIFIED: ICD-10-CM

## 2025-04-09 DIAGNOSIS — R56.9 UNSPECIFIED CONVULSIONS: ICD-10-CM

## 2025-04-09 DIAGNOSIS — Z82.49 FAMILY HISTORY OF ISCHEMIC HEART DISEASE AND OTHER DISEASES OF THE CIRCULATORY SYSTEM: ICD-10-CM

## 2025-04-09 DIAGNOSIS — M41.9 SCOLIOSIS, UNSPECIFIED: ICD-10-CM

## 2025-04-09 DIAGNOSIS — Z00.01 ENCOUNTER FOR GENERAL ADULT MEDICAL EXAMINATION WITH ABNORMAL FINDINGS: ICD-10-CM

## 2025-04-09 DIAGNOSIS — E55.9 VITAMIN D DEFICIENCY, UNSPECIFIED: ICD-10-CM

## 2025-04-09 DIAGNOSIS — Z23 ENCOUNTER FOR IMMUNIZATION: ICD-10-CM

## 2025-04-09 DIAGNOSIS — Z91.89 OTHER SPECIFIED PERSONAL RISK FACTORS, NOT ELSEWHERE CLASSIFIED: ICD-10-CM

## 2025-04-09 DIAGNOSIS — Z80.0 FAMILY HISTORY OF MALIGNANT NEOPLASM OF DIGESTIVE ORGANS: ICD-10-CM

## 2025-04-09 DIAGNOSIS — Z87.898 PERSONAL HISTORY OF OTHER SPECIFIED CONDITIONS: ICD-10-CM

## 2025-04-09 DIAGNOSIS — G44.89 OTHER HEADACHE SYNDROME: ICD-10-CM

## 2025-04-09 PROCEDURE — 99385 PREV VISIT NEW AGE 18-39: CPT | Mod: 25

## 2025-04-09 PROCEDURE — 90471 IMMUNIZATION ADMIN: CPT

## 2025-04-09 PROCEDURE — 90715 TDAP VACCINE 7 YRS/> IM: CPT

## 2025-04-15 ENCOUNTER — LABORATORY RESULT (OUTPATIENT)
Age: 25
End: 2025-04-15

## 2025-04-15 LAB
APPEARANCE: ABNORMAL
BILIRUBIN URINE: NEGATIVE
BLOOD URINE: NEGATIVE
COLOR: YELLOW
GLUCOSE QUALITATIVE U: NEGATIVE MG/DL
KETONES URINE: NEGATIVE MG/DL
LEUKOCYTE ESTERASE URINE: NEGATIVE
NITRITE URINE: NEGATIVE
PH URINE: 5.5
PROTEIN URINE: NORMAL MG/DL
SPECIFIC GRAVITY URINE: 1.03
UROBILINOGEN URINE: 0.2 MG/DL

## 2025-04-16 LAB
25(OH)D3 SERPL-MCNC: 30.8 NG/ML
ALBUMIN SERPL ELPH-MCNC: 4.1 G/DL
ALP BLD-CCNC: 83 U/L
ALT SERPL-CCNC: 16 U/L
ANION GAP SERPL CALC-SCNC: 12 MMOL/L
AST SERPL-CCNC: 20 U/L
BASOPHILS # BLD AUTO: 0.08 K/UL
BASOPHILS NFR BLD AUTO: 0.9 %
BILIRUB SERPL-MCNC: 0.2 MG/DL
BUN SERPL-MCNC: 12 MG/DL
CALCIUM SERPL-MCNC: 9.5 MG/DL
CHLORIDE SERPL-SCNC: 106 MMOL/L
CHOLEST SERPL-MCNC: 182 MG/DL
CO2 SERPL-SCNC: 23 MMOL/L
CREAT SERPL-MCNC: 0.8 MG/DL
EGFRCR SERPLBLD CKD-EPI 2021: 105 ML/MIN/1.73M2
EOSINOPHIL # BLD AUTO: 0.28 K/UL
EOSINOPHIL NFR BLD AUTO: 3.1 %
ESTIMATED AVERAGE GLUCOSE: 105 MG/DL
FERRITIN SERPL-MCNC: 67 NG/ML
FOLATE SERPL-MCNC: 16.4 NG/ML
GLUCOSE SERPL-MCNC: 84 MG/DL
HBA1C MFR BLD HPLC: 5.3 %
HCT VFR BLD CALC: 41.5 %
HCV AB SER QL: NONREACTIVE
HCV S/CO RATIO: 0.11 S/CO
HDLC SERPL-MCNC: 58 MG/DL
HGB BLD-MCNC: 14.2 G/DL
HIV1+2 AB SPEC QL IA.RAPID: NONREACTIVE
IMM GRANULOCYTES NFR BLD AUTO: 0.3 %
IRON SATN MFR SERPL: 20 %
IRON SERPL-MCNC: 71 UG/DL
LDLC SERPL-MCNC: 109 MG/DL
LYMPHOCYTES # BLD AUTO: 3.33 K/UL
LYMPHOCYTES NFR BLD AUTO: 36.4 %
MAN DIFF?: NORMAL
MCHC RBC-ENTMCNC: 30 PG
MCHC RBC-ENTMCNC: 34.2 G/DL
MCV RBC AUTO: 87.7 FL
MONOCYTES # BLD AUTO: 0.74 K/UL
MONOCYTES NFR BLD AUTO: 8.1 %
NEUTROPHILS # BLD AUTO: 4.69 K/UL
NEUTROPHILS NFR BLD AUTO: 51.2 %
NONHDLC SERPL-MCNC: 125 MG/DL
PLATELET # BLD AUTO: 417 K/UL
POTASSIUM SERPL-SCNC: 4.2 MMOL/L
PROT SERPL-MCNC: 7.1 G/DL
RBC # BLD: 4.73 M/UL
RBC # FLD: 14.1 %
SODIUM SERPL-SCNC: 140 MMOL/L
TIBC SERPL-MCNC: 364 UG/DL
TRIGL SERPL-MCNC: 82 MG/DL
TSH SERPL-ACNC: 4.15 UIU/ML
UIBC SERPL-MCNC: 293 UG/DL
VIT B12 SERPL-MCNC: 393 PG/ML
WBC # FLD AUTO: 9.15 K/UL

## 2025-04-18 ENCOUNTER — NON-APPOINTMENT (OUTPATIENT)
Age: 25
End: 2025-04-18

## 2025-04-22 ENCOUNTER — NON-APPOINTMENT (OUTPATIENT)
Age: 25
End: 2025-04-22

## 2025-04-29 ENCOUNTER — APPOINTMENT (OUTPATIENT)
Dept: FAMILY MEDICINE | Facility: CLINIC | Age: 25
End: 2025-04-29

## 2025-04-29 ENCOUNTER — APPOINTMENT (OUTPATIENT)
Dept: OBGYN | Facility: CLINIC | Age: 25
End: 2025-04-29

## 2025-07-18 PROBLEM — Z01.419 WOMEN'S ANNUAL ROUTINE GYNECOLOGICAL EXAMINATION: Status: RESOLVED | Noted: 2025-01-31 | Resolved: 2025-07-18

## 2025-07-21 ENCOUNTER — APPOINTMENT (OUTPATIENT)
Dept: OBGYN | Facility: CLINIC | Age: 25
End: 2025-07-21
Payer: COMMERCIAL

## 2025-07-21 VITALS
SYSTOLIC BLOOD PRESSURE: 129 MMHG | HEART RATE: 79 BPM | TEMPERATURE: 98.1 F | OXYGEN SATURATION: 97 % | BODY MASS INDEX: 31.24 KG/M2 | HEIGHT: 64 IN | WEIGHT: 183 LBS | DIASTOLIC BLOOD PRESSURE: 89 MMHG

## 2025-07-21 DIAGNOSIS — Z01.419 ENCOUNTER FOR GYNECOLOGICAL EXAMINATION (GENERAL) (ROUTINE) W/OUT ABNORMAL FINDINGS: ICD-10-CM

## 2025-07-21 PROCEDURE — 99459 PELVIC EXAMINATION: CPT

## 2025-07-21 PROCEDURE — G0101: CPT

## 2025-07-21 PROCEDURE — 99385 PREV VISIT NEW AGE 18-39: CPT

## 2025-07-24 ENCOUNTER — NON-APPOINTMENT (OUTPATIENT)
Age: 25
End: 2025-07-24

## 2025-07-25 ENCOUNTER — NON-APPOINTMENT (OUTPATIENT)
Age: 25
End: 2025-07-25

## 2025-07-25 LAB — CYTOLOGY CVX/VAG DOC THIN PREP: NORMAL
